# Patient Record
Sex: MALE | Race: WHITE | NOT HISPANIC OR LATINO | Employment: UNEMPLOYED | URBAN - METROPOLITAN AREA
[De-identification: names, ages, dates, MRNs, and addresses within clinical notes are randomized per-mention and may not be internally consistent; named-entity substitution may affect disease eponyms.]

---

## 2017-07-10 ENCOUNTER — HOSPITAL ENCOUNTER (INPATIENT)
Facility: MEDICAL CENTER | Age: 62
LOS: 8 days | DRG: 380 | End: 2017-07-18
Attending: EMERGENCY MEDICINE | Admitting: HOSPITALIST
Payer: COMMERCIAL

## 2017-07-10 ENCOUNTER — RESOLUTE PROFESSIONAL BILLING HOSPITAL PROF FEE (OUTPATIENT)
Dept: HOSPITALIST | Facility: MEDICAL CENTER | Age: 62
End: 2017-07-10
Payer: COMMERCIAL

## 2017-07-10 DIAGNOSIS — K92.2 UPPER GI BLEED: ICD-10-CM

## 2017-07-10 DIAGNOSIS — E10.10 DIABETIC KETOACIDOSIS WITHOUT COMA ASSOCIATED WITH TYPE 1 DIABETES MELLITUS (HCC): ICD-10-CM

## 2017-07-10 PROBLEM — E11.10 DKA (DIABETIC KETOACIDOSES): Status: ACTIVE | Noted: 2017-07-10

## 2017-07-10 LAB
ABO GROUP BLD: NORMAL
ALBUMIN SERPL BCP-MCNC: 3.4 G/DL (ref 3.2–4.9)
ALBUMIN/GLOB SERPL: 1 G/DL
ALP SERPL-CCNC: 64 U/L (ref 30–99)
ALT SERPL-CCNC: <5 U/L (ref 2–50)
ANION GAP SERPL CALC-SCNC: 17 MMOL/L (ref 0–11.9)
ANISOCYTOSIS BLD QL SMEAR: ABNORMAL
AST SERPL-CCNC: 6 U/L (ref 12–45)
BASOPHILS # BLD AUTO: 0.9 % (ref 0–1.8)
BASOPHILS # BLD: 0.18 K/UL (ref 0–0.12)
BILIRUB SERPL-MCNC: 0.4 MG/DL (ref 0.1–1.5)
BLD GP AB SCN SERPL QL: NORMAL
BUN SERPL-MCNC: 23 MG/DL (ref 8–22)
CALCIUM SERPL-MCNC: 9 MG/DL (ref 8.5–10.5)
CHLORIDE SERPL-SCNC: 106 MMOL/L (ref 96–112)
CO2 SERPL-SCNC: 10 MMOL/L (ref 20–33)
CREAT SERPL-MCNC: 1.27 MG/DL (ref 0.5–1.4)
EOSINOPHIL # BLD AUTO: 0 K/UL (ref 0–0.51)
EOSINOPHIL NFR BLD: 0 % (ref 0–6.9)
ERYTHROCYTE [DISTWIDTH] IN BLOOD BY AUTOMATED COUNT: 44.2 FL (ref 35.9–50)
GFR SERPL CREATININE-BSD FRML MDRD: 58 ML/MIN/1.73 M 2
GLOBULIN SER CALC-MCNC: 3.5 G/DL (ref 1.9–3.5)
GLUCOSE SERPL-MCNC: 294 MG/DL (ref 65–99)
HCT VFR BLD AUTO: 41.2 % (ref 42–52)
HGB BLD-MCNC: 13.8 G/DL (ref 14–18)
LYMPHOCYTES # BLD AUTO: 0.69 K/UL (ref 1–4.8)
LYMPHOCYTES NFR BLD: 3.5 % (ref 22–41)
MANUAL DIFF BLD: NORMAL
MCH RBC QN AUTO: 30.2 PG (ref 27–33)
MCHC RBC AUTO-ENTMCNC: 33.5 G/DL (ref 33.7–35.3)
MCV RBC AUTO: 90.2 FL (ref 81.4–97.8)
MICROCYTES BLD QL SMEAR: ABNORMAL
MONOCYTES # BLD AUTO: 0.86 K/UL (ref 0–0.85)
MONOCYTES NFR BLD AUTO: 4.4 % (ref 0–13.4)
MORPHOLOGY BLD-IMP: NORMAL
MYELOCYTES NFR BLD MANUAL: 0.9 %
NEUTROPHILS # BLD AUTO: 17.7 K/UL (ref 1.82–7.42)
NEUTROPHILS NFR BLD: 89.4 % (ref 44–72)
NEUTS BAND NFR BLD MANUAL: 0.9 % (ref 0–10)
NRBC # BLD AUTO: 0 K/UL
NRBC BLD AUTO-RTO: 0 /100 WBC
PLATELET # BLD AUTO: 488 K/UL (ref 164–446)
PLATELET BLD QL SMEAR: NORMAL
PMV BLD AUTO: 9.3 FL (ref 9–12.9)
POTASSIUM SERPL-SCNC: 4.7 MMOL/L (ref 3.6–5.5)
PROT SERPL-MCNC: 6.9 G/DL (ref 6–8.2)
RBC # BLD AUTO: 4.57 M/UL (ref 4.7–6.1)
RBC BLD AUTO: PRESENT
RH BLD: NORMAL
SODIUM SERPL-SCNC: 133 MMOL/L (ref 135–145)
WBC # BLD AUTO: 19.6 K/UL (ref 4.8–10.8)

## 2017-07-10 PROCEDURE — 700105 HCHG RX REV CODE 258: Performed by: EMERGENCY MEDICINE

## 2017-07-10 PROCEDURE — 770022 HCHG ROOM/CARE - ICU (200)

## 2017-07-10 PROCEDURE — 700105 HCHG RX REV CODE 258: Performed by: PHARMACIST

## 2017-07-10 PROCEDURE — 36415 COLL VENOUS BLD VENIPUNCTURE: CPT

## 2017-07-10 PROCEDURE — 99291 CRITICAL CARE FIRST HOUR: CPT | Performed by: HOSPITALIST

## 2017-07-10 PROCEDURE — 96365 THER/PROPH/DIAG IV INF INIT: CPT

## 2017-07-10 PROCEDURE — 700102 HCHG RX REV CODE 250 W/ 637 OVERRIDE(OP): Performed by: HOSPITALIST

## 2017-07-10 PROCEDURE — 83735 ASSAY OF MAGNESIUM: CPT

## 2017-07-10 PROCEDURE — 86850 RBC ANTIBODY SCREEN: CPT

## 2017-07-10 PROCEDURE — C9113 INJ PANTOPRAZOLE SODIUM, VIA: HCPCS | Performed by: HOSPITALIST

## 2017-07-10 PROCEDURE — 82962 GLUCOSE BLOOD TEST: CPT

## 2017-07-10 PROCEDURE — A9270 NON-COVERED ITEM OR SERVICE: HCPCS | Performed by: HOSPITALIST

## 2017-07-10 PROCEDURE — C9113 INJ PANTOPRAZOLE SODIUM, VIA: HCPCS | Performed by: EMERGENCY MEDICINE

## 2017-07-10 PROCEDURE — 96366 THER/PROPH/DIAG IV INF ADDON: CPT

## 2017-07-10 PROCEDURE — 86900 BLOOD TYPING SEROLOGIC ABO: CPT

## 2017-07-10 PROCEDURE — 85027 COMPLETE CBC AUTOMATED: CPT

## 2017-07-10 PROCEDURE — 86901 BLOOD TYPING SEROLOGIC RH(D): CPT

## 2017-07-10 PROCEDURE — 700111 HCHG RX REV CODE 636 W/ 250 OVERRIDE (IP): Performed by: HOSPITALIST

## 2017-07-10 PROCEDURE — 85007 BL SMEAR W/DIFF WBC COUNT: CPT

## 2017-07-10 PROCEDURE — 700111 HCHG RX REV CODE 636 W/ 250 OVERRIDE (IP): Performed by: EMERGENCY MEDICINE

## 2017-07-10 PROCEDURE — 99291 CRITICAL CARE FIRST HOUR: CPT

## 2017-07-10 PROCEDURE — 80053 COMPREHEN METABOLIC PANEL: CPT

## 2017-07-10 PROCEDURE — 84100 ASSAY OF PHOSPHORUS: CPT

## 2017-07-10 PROCEDURE — 700101 HCHG RX REV CODE 250: Performed by: HOSPITALIST

## 2017-07-10 PROCEDURE — 80048 BASIC METABOLIC PNL TOTAL CA: CPT

## 2017-07-10 PROCEDURE — 700102 HCHG RX REV CODE 250 W/ 637 OVERRIDE(OP): Performed by: PHARMACIST

## 2017-07-10 PROCEDURE — 700105 HCHG RX REV CODE 258: Performed by: HOSPITALIST

## 2017-07-10 RX ORDER — SODIUM CHLORIDE 9 MG/ML
1000 INJECTION, SOLUTION INTRAVENOUS ONCE
Status: COMPLETED | OUTPATIENT
Start: 2017-07-10 | End: 2017-07-10

## 2017-07-10 RX ORDER — POLYETHYLENE GLYCOL 3350 17 G/17G
1 POWDER, FOR SOLUTION ORAL
Status: DISCONTINUED | OUTPATIENT
Start: 2017-07-10 | End: 2017-07-18 | Stop reason: HOSPADM

## 2017-07-10 RX ORDER — LABETALOL HYDROCHLORIDE 5 MG/ML
10 INJECTION, SOLUTION INTRAVENOUS EVERY 4 HOURS PRN
Status: DISCONTINUED | OUTPATIENT
Start: 2017-07-10 | End: 2017-07-18 | Stop reason: HOSPADM

## 2017-07-10 RX ORDER — SODIUM CHLORIDE 9 MG/ML
INJECTION, SOLUTION INTRAVENOUS CONTINUOUS
Status: DISCONTINUED | OUTPATIENT
Start: 2017-07-10 | End: 2017-07-12

## 2017-07-10 RX ORDER — LISINOPRIL 10 MG/1
10 TABLET ORAL DAILY
COMMUNITY

## 2017-07-10 RX ORDER — MAGNESIUM SULFATE HEPTAHYDRATE 40 MG/ML
2 INJECTION, SOLUTION INTRAVENOUS
Status: COMPLETED | OUTPATIENT
Start: 2017-07-10 | End: 2017-07-11

## 2017-07-10 RX ORDER — LISINOPRIL 10 MG/1
10 TABLET ORAL
Status: DISCONTINUED | OUTPATIENT
Start: 2017-07-11 | End: 2017-07-18 | Stop reason: HOSPADM

## 2017-07-10 RX ORDER — PANTOPRAZOLE SODIUM 40 MG/10ML
40 INJECTION, POWDER, LYOPHILIZED, FOR SOLUTION INTRAVENOUS 2 TIMES DAILY
Status: DISCONTINUED | OUTPATIENT
Start: 2017-07-10 | End: 2017-07-14

## 2017-07-10 RX ORDER — DEXTROSE AND SODIUM CHLORIDE 10; .45 G/100ML; G/100ML
INJECTION, SOLUTION INTRAVENOUS CONTINUOUS
Status: ACTIVE | OUTPATIENT
Start: 2017-07-10 | End: 2017-07-12

## 2017-07-10 RX ORDER — MAGNESIUM SULFATE HEPTAHYDRATE 40 MG/ML
4 INJECTION, SOLUTION INTRAVENOUS
Status: COMPLETED | OUTPATIENT
Start: 2017-07-10 | End: 2017-07-11

## 2017-07-10 RX ORDER — SODIUM CHLORIDE 9 MG/ML
2000 INJECTION, SOLUTION INTRAVENOUS ONCE
Status: COMPLETED | OUTPATIENT
Start: 2017-07-10 | End: 2017-07-10

## 2017-07-10 RX ORDER — DEXTROSE MONOHYDRATE 25 G/50ML
25 INJECTION, SOLUTION INTRAVENOUS
Status: DISCONTINUED | OUTPATIENT
Start: 2017-07-10 | End: 2017-07-10

## 2017-07-10 RX ORDER — DEXTROSE AND SODIUM CHLORIDE 5; .9 G/100ML; G/100ML
INJECTION, SOLUTION INTRAVENOUS CONTINUOUS
Status: DISCONTINUED | OUTPATIENT
Start: 2017-07-10 | End: 2017-07-12

## 2017-07-10 RX ORDER — SIMVASTATIN 10 MG
10 TABLET ORAL EVERY EVENING
Status: DISCONTINUED | OUTPATIENT
Start: 2017-07-10 | End: 2017-07-18 | Stop reason: HOSPADM

## 2017-07-10 RX ORDER — SIMVASTATIN 10 MG
10 TABLET ORAL
COMMUNITY

## 2017-07-10 RX ORDER — AMOXICILLIN 250 MG
2 CAPSULE ORAL 2 TIMES DAILY
Status: DISCONTINUED | OUTPATIENT
Start: 2017-07-10 | End: 2017-07-18 | Stop reason: HOSPADM

## 2017-07-10 RX ORDER — SODIUM CHLORIDE 9 MG/ML
INJECTION, SOLUTION INTRAVENOUS CONTINUOUS
Status: DISCONTINUED | OUTPATIENT
Start: 2017-07-10 | End: 2017-07-10

## 2017-07-10 RX ORDER — BISACODYL 10 MG
10 SUPPOSITORY, RECTAL RECTAL
Status: DISCONTINUED | OUTPATIENT
Start: 2017-07-10 | End: 2017-07-18 | Stop reason: HOSPADM

## 2017-07-10 RX ADMIN — DEXTROSE AND SODIUM CHLORIDE: 5; 900 INJECTION, SOLUTION INTRAVENOUS at 22:48

## 2017-07-10 RX ADMIN — THIAMINE HYDROCHLORIDE 100 MG: 100 INJECTION, SOLUTION INTRAMUSCULAR; INTRAVENOUS at 22:43

## 2017-07-10 RX ADMIN — STANDARDIZED SENNA CONCENTRATE AND DOCUSATE SODIUM 2 TABLET: 8.6; 5 TABLET, FILM COATED ORAL at 23:35

## 2017-07-10 RX ADMIN — SODIUM CHLORIDE 8 MG/HR: 9 INJECTION, SOLUTION INTRAVENOUS at 20:15

## 2017-07-10 RX ADMIN — SODIUM CHLORIDE 1000 ML: 9 INJECTION, SOLUTION INTRAVENOUS at 21:21

## 2017-07-10 RX ADMIN — SODIUM CHLORIDE 4 UNITS/HR: 9 INJECTION, SOLUTION INTRAVENOUS at 22:43

## 2017-07-10 RX ADMIN — SIMVASTATIN 10 MG: 10 TABLET, FILM COATED ORAL at 22:55

## 2017-07-10 RX ADMIN — PANTOPRAZOLE SODIUM 40 MG: 40 INJECTION, POWDER, FOR SOLUTION INTRAVENOUS at 23:08

## 2017-07-10 RX ADMIN — FOLIC ACID 1 MG: 5 INJECTION, SOLUTION INTRAMUSCULAR; INTRAVENOUS; SUBCUTANEOUS at 22:57

## 2017-07-10 RX ADMIN — SODIUM CHLORIDE 2000 ML: 9 INJECTION, SOLUTION INTRAVENOUS at 20:51

## 2017-07-10 RX ADMIN — LABETALOL HYDROCHLORIDE 10 MG: 5 INJECTION, SOLUTION INTRAVENOUS at 23:20

## 2017-07-10 ASSESSMENT — ENCOUNTER SYMPTOMS
RESPIRATORY NEGATIVE: 1
ABDOMINAL PAIN: 0
HEADACHES: 1
NAUSEA: 1
CONSTIPATION: 0
BLOOD IN STOOL: 1
VOMITING: 1
DIARRHEA: 0
MUSCULOSKELETAL NEGATIVE: 1
DIAPHORESIS: 1
EYES NEGATIVE: 1

## 2017-07-10 ASSESSMENT — LIFESTYLE VARIABLES
DO YOU DRINK ALCOHOL: NO
EVER_SMOKED: YES

## 2017-07-10 ASSESSMENT — PAIN SCALES - GENERAL
PAINLEVEL_OUTOF10: 6
PAINLEVEL_OUTOF10: 6

## 2017-07-10 NOTE — IP AVS SNAPSHOT
7/18/2017    [facility]  [address] [facility]  [place] [address]    Dear Nine:    [facility] wants to ensure your discharge home is safe and you or your loved ones have had all of your questions answered regarding your care after you leave the hospital.    Below is a list of resources and contact information should you have any questions regarding your hospital stay, follow-up instructions, or active medical symptoms.    Questions or Concerns Regarding… Contact   Medical Questions Related to Your Discharge  (7 days a week, 8am-5pm) Contact a Nurse Care Coordinator   [phone]   Medical Questions Not Related to Your Discharge  (24 hours a day / 7 days a week)  Contact the Nurse Health Line   [phone]    Medications or Discharge Instructions Refer to your discharge packet   or contact your [facility] Primary Care Provider   [phone]   Follow-up Appointment(s) Schedule your appointment via GMG33   or contact Scheduling [phone]   Billing Review your statement via GMG33  or contact Billing [phone]   Medical Records Review your records via GMG33   or contact Medical Records [phone]     You may receive a telephone call within two days of discharge. This call is to make certain you understand your discharge instructions and have the opportunity to have any questions answered. You can also easily access your medical information, test results and upcoming appointments via the GMG33 free online health management tool. You can learn more and sign up at [email or web address]/[email or web address]. For assistance setting up your GMG33 account, please call [phone].    Once again, we want to ensure your discharge home is safe and that you have a clear understanding of any next steps in your care. If you have any questions or concerns, please do not hesitate to contact us, we are here for you. Thank you for choosing [facility] for your healthcare needs.    Sincerely,    Your [facility] Healthcare Team

## 2017-07-10 NOTE — IP AVS SNAPSHOT
Home Care Instructions                                                                                                                  Name:[facility]  Medical Record Number:[id number]  CSN: [id number]    YOB: 1955   Age: 62 y.o.  Sex: male  HT:1.829 m (6') WT: 79.5 kg (175 lb 4.3 oz)          Admit Date: 7/10/2017     Discharge Date:   Today's Date: 7/18/2017  Attending Doctor:  Shu Celestin M.D.                  Allergies:  Review of patient's allergies indicates no known allergies.            Discharge Instructions       Discharge Instructions    Discharged to other by car with escort. Discharged via wheelchair, hospital escort: Yes.  Special equipment needed: Not Applicable    Be sure to schedule a follow-up appointment with your primary care doctor or any specialists as instructed.     Discharge Plan:   Diet Plan: Discussed  Activity Level: Discussed  Smoking Cessation Offered: Patient Refused  Confirmed Follow up Appointment: Patient to Call and Schedule Appointment (follow up with PCP in 1 week)  Confirmed Symptoms Management: Discussed  Medication Reconciliation Updated: Yes  Pneumococcal Vaccine Given - only chart on this line when given: Given (See MAR)  Influenza Vaccine Indication: Indicated: Not available from distributor/    I understand that a diet low in cholesterol, fat, and sodium is recommended for good health. Unless I have been given specific instructions below for another diet, I accept this instruction as my diet prescription.   Other diet: Regular    Special Instructions: None    · Is patient discharged on Warfarin / Coumadin?   No     · Is patient Post Blood Transfusion?  No    Depression / Suicide Risk    As you are discharged from this [facility] facility, it is important to learn how to keep safe from harming yourself.    Recognize the warning signs:  · Abrupt changes in personality, positive or negative- including increase in energy   · Giving away  possessions  · Change in eating patterns- significant weight changes-  positive or negative  · Change in sleeping patterns- unable to sleep or sleeping all the time   · Unwillingness or inability to communicate  · Depression  · Unusual sadness, discouragement and loneliness  · Talk of wanting to die  · Neglect of personal appearance   · Rebelliousness- reckless behavior  · Withdrawal from people/activities they love  · Confusion- inability to concentrate     If you or a loved one observes any of these behaviors or has concerns about self-harm, here's what you can do:  · Talk about it- your feelings and reasons for harming yourself  · Remove any means that you might use to hurt yourself (examples: pills, rope, extension cords, firearm)  · Get professional help from the community (Mental Health, Substance Abuse, psychological counseling)  · Do not be alone:Call your Safe Contact- someone whom you trust who will be there for you.  · Call your local CRISIS HOTLINE 174-7481 or 259-749-1449  · Call your local Children's Mobile Crisis Response Team [place] (874) 338-0229 or wwwCelframe  · Call the toll free National Suicide Prevention Hotlines   · National Suicide Prevention Lifeline 011-588-FWJC (1598)  · National Hope Line Network 800-SUICIDE (929-2191)        Follow-up Information     1. Follow up with Pcp Pt States None.    Specialty:  Family Medicine         Discharge Medication Instructions:    Below are the medications your physician expects you to take upon discharge:    Review all your home medications and newly ordered medications with your doctor and/or pharmacist. Follow medication instructions as directed by your doctor and/or pharmacist.    Please keep your medication list with you and share with your physician.               Medication List      START taking these medications        Instructions    Morning Afternoon Evening Bedtime    amoxicillin-clavulanate 875-125 MG Tabs   Last time this  was given:  1 Tab on 7/18/2017  7:54 AM   Commonly known as:  AUGMENTIN   Next Dose Due:  7/18 8pm        Take 1 Tab by mouth every 12 hours.   Dose:  1 Tab                        insulin lispro 100 UNIT/ML Soln   Last time this was given:  2 Units on 7/18/2017  6:29 AM   Commonly known as:  HUMALOG   Next Dose Due:  7/18 before lunch        Inject 2-9 Units as instructed 4 Times a Day,Before Meals and at Bedtime.   Dose:  2-9 Units                        insulin  UNIT/ML Susp   Last time this was given:  27 Units on 7/18/2017  6:29 AM   Commonly known as:  HUMULIN,NOVOLIN   Next Dose Due:  7/18 pm        Inject 24 Units as instructed 2 Times a Day.   Dose:  24 Units                        omeprazole 40 MG delayed-release capsule   Last time this was given:  40 mg on 7/18/2017  7:54 AM   Commonly known as:  PRILOSEC   Next Dose Due:  7/18 pm        Take 1 Cap by mouth 2 Times a Day.   Dose:  40 mg                          CONTINUE taking these medications        Instructions    Morning Afternoon Evening Bedtime    lisinopril 10 MG Tabs   Last time this was given:  10 mg on 7/18/2017  7:54 AM   Commonly known as:  PRINIVIL   Next Dose Due:  7/19 am        Take 10 mg by mouth every day.   Dose:  10 mg                        metformin 850 MG Tabs   Commonly known as:  GLUCOPHAGE   Next Dose Due:  7/18 pm        Take 850 mg by mouth 3 times a day.   Dose:  850 mg                        simvastatin 10 MG Tabs   Last time this was given:  10 mg on 7/17/2017  8:13 PM   Commonly known as:  ZOCOR   Next Dose Due:  7/18 pm        Take 10 mg by mouth every bedtime.   Dose:  10 mg                          STOP taking these medications     aspirin EC 81 MG Tbec   Commonly known as:  ECOTRIN                    Where to Get Your Medications      Information about where to get these medications is not yet available     ! Ask your nurse or doctor about these medications    - amoxicillin-clavulanate 875-125 MG Tabs  - insulin  lispro 100 UNIT/ML Soln  - insulin  UNIT/ML Susp  - omeprazole 40 MG delayed-release capsule            Instructions           Diet / Nutrition:    Follow any diet instructions given to you by your doctor or the dietician, including how much salt (sodium) you are allowed each day.    If you are overweight, talk to your doctor about a weight reduction plan.    Activity:    Remain physically active following your doctor's instructions about exercise and activity.    Rest often.     Any time you become even a little tired or short of breath, SIT DOWN and rest.    Worsening Symptoms:    Report any of the following signs and symptoms to the doctor's office immediately:    *Pain of jaw, arm, or neck  *Chest pain not relieved by medication                               *Dizziness or loss of consciousness  *Difficulty breathing even when at rest   *More tired than usual                                       *Bleeding drainage or swelling of surgical site  *Swelling of feet, ankles, legs or stomach                 *Fever (>100ºF)  *Pink or blood tinged sputum  *Weight gain (3lbs/day or 5lbs /week)           *Shock from internal defibrillator (if applicable)  *Palpitations or irregular heartbeats                *Cool and/or numb extremities    Stroke Awareness    Common Risk Factors for Stroke include:    Age  Atrial Fibrillation  Carotid Artery Stenosis  Diabetes Mellitus  Excessive alcohol consumption  High blood pressure  Overweight   Physical inactivity  Smoking    Warning signs and symptoms of a stroke include:    *Sudden numbness or weakness of the face, arm or leg (especially on one side of the body).  *Sudden confusion, trouble speaking or understanding.  *Sudden trouble seeing in one or both eyes.  *Sudden trouble walking, dizziness, loss of balance or coordination.Sudden severe headache with no known cause.    It is very important to get treatment quickly when a stroke occurs. If you experience any of the  above warning signs, call 371 immediately.                   Disclaimer         Quit Smoking / Tobacco Use:    I understand the use of any tobacco products increases my chance of suffering from future heart disease or stroke and could cause other illnesses which may shorten my life. Quitting the use of tobacco products is the single most important thing I can do to improve my health. For further information on smoking / tobacco cessation call a Toll Free Quit Line at 1-990.418.9329 (*National [employer]ure) or 1-409.116.6291 (American Lung Association) or you can access the web based program at www.lungusa.org.    [place] Tobacco Users Help Line:  (142) 630-2840       Toll Free: 1-341.116.6298  Quit Tobacco Program [facility] (902)238-6253    Crisis Hotline:    [place] Crisis Hotline:  8-449-NODPDYU or 1-837.607.7733    [place] Crisis Hotline:    1-614.106.3070 or 973-133-1540    Discharge Survey:   Thank you for choosing [facility]. We hope we did everything we could to make your hospital stay a pleasant one. You may be receiving a phone survey and we would appreciate your time and participation in answering the questions. Your input is very valuable to us in our efforts to improve our service to our patients and their families.        My signature on this form indicates that:    1. I have reviewed and understand the above information.  2. My questions regarding this information have been answered to my satisfaction.  3. I have formulated a plan with my discharge nurse to obtain my prescribed medications for home.                  Disclaimer         __________________________________                     __________       ________                       Patient Signature                                                 Date                    Time

## 2017-07-10 NOTE — IP AVS SNAPSHOT
" <p align=\"LEFT\"><IMG SRC=\"//[email or web address]$/[email or web address]\" alt=\"Image\" WIDTH=\"50%\" HEIGHT=\"200\" BORDER=\"\"></p>                   Name:Marge Frye  Medical Record Number:[id number]  CSN: [id number]    YOB: 1955   Age: 62 y.o.  Sex: male  HT:1.829 m (6') WT: 79.5 kg (175 lb 4.3 oz)          Admit Date: 7/10/2017     Discharge Date:   Today's Date: 7/18/2017  Attending Doctor:  Shu Celestin M.D.                  Allergies:  Review of patient's allergies indicates no known allergies.          Follow-up Information     1. Follow up with Pcp Pt States None.    Specialty:  Family Medicine         Medication List      Take these Medications        Instructions    amoxicillin-clavulanate 875-125 MG Tabs   Commonly known as:  AUGMENTIN    Take 1 Tab by mouth every 12 hours.   Dose:  1 Tab       insulin lispro 100 UNIT/ML Soln   Commonly known as:  HUMALOG    Inject 2-9 Units as instructed 4 Times a Day,Before Meals and at Bedtime.   Dose:  2-9 Units       insulin  UNIT/ML Susp   Commonly known as:  HUMULIN,NOVOLIN    Inject 24 Units as instructed 2 Times a Day.   Dose:  24 Units       lisinopril 10 MG Tabs   Commonly known as:  PRINIVIL    Take 10 mg by mouth every day.   Dose:  10 mg       metformin 850 MG Tabs   Commonly known as:  GLUCOPHAGE    Take 850 mg by mouth 3 times a day.   Dose:  850 mg       omeprazole 40 MG delayed-release capsule   Commonly known as:  PRILOSEC    Take 1 Cap by mouth 2 Times a Day.   Dose:  40 mg       simvastatin 10 MG Tabs   Commonly known as:  ZOCOR    Take 10 mg by mouth every bedtime.   Dose:  10 mg         "

## 2017-07-10 NOTE — IP AVS SNAPSHOT
WorldRemit Access Code: [email or web address]  Expires: 8/17/2017 11:21 AM    Your email address is not on file at [facility] [employer].  Email Addresses are required for you to sign up for WorldRemit, please contact [phone] to verify your personal information and to provide your email address prior to attempting to register for WorldRemit.    [address] [address]    WorldRemit  A secure, online tool to manage your health information     [employer]’s WorldRemit® is a secure, online tool that connects you to your personalized health information from the privacy of your home -- day or night - making it very easy for you to manage your healthcare. Once the activation process is completed, you can even access your medical information using the WorldRemit tanner, which is available for free in the Apple Tanner store or Google Play store.     To learn more about WorldRemit, visit [email or web address]    There are two levels of access available (as shown below):   My Chart Features  [facility] Primary Care Doctor [facility]  Specialists [facility]  Urgent  Care Non-[facility] Primary Care Doctor   Email your healthcare team securely and privately 24/7 X X X    Manage appointments: schedule your next appointment; view details of past/upcoming appointments X      Request prescription refills. X      View recent personal medical records, including lab and immunizations X X X X   View health record, including health history, allergies, medications X X X X   Read reports about your outpatient visits, procedures, consult and ER notes X X X X   See your discharge summary, which is a recap of your hospital and/or ER visit that includes your diagnosis, lab results, and care plan X X  X     How to register for [employer]:  Once your e-mail address has been verified, follow the following steps to sign up for WorldRemit.     1. Go to  [email or web address]  2. Click on the Sign Up Now box, which takes you to the New Member Sign Up page. You will  need to provide the following information:  a. Enter your oboxo Access Code exactly as it appears at the top of this page. (You will not need to use this code after you’ve completed the sign-up process. If you do not sign up before the expiration date, you must request a new code.)   b. Enter your date of birth.   c. Enter your home email address.   d. Click Submit, and follow the next screen’s instructions.  3. Create a DNART LIMITADAt ID. This will be your oboxo login ID and cannot be changed, so think of one that is secure and easy to remember.  4. Create a oboxo password. You can change your password at any time.  5. Enter your Password Reset Question and Answer. This can be used at a later time if you forget your password.   6. Enter your e-mail address. This allows you to receive e-mail notifications when new information is available in oboxo.  7. Click Sign Up. You can now view your health information.    For assistance activating your oboxo account, call [phone]

## 2017-07-11 ENCOUNTER — APPOINTMENT (OUTPATIENT)
Dept: RADIOLOGY | Facility: MEDICAL CENTER | Age: 62
DRG: 380 | End: 2017-07-11
Attending: HOSPITALIST
Payer: COMMERCIAL

## 2017-07-11 ENCOUNTER — HOSPITAL ENCOUNTER (OUTPATIENT)
Dept: RADIOLOGY | Facility: MEDICAL CENTER | Age: 62
End: 2017-07-11

## 2017-07-11 PROBLEM — R51 HEADACHE(784.0): Status: ACTIVE | Noted: 2017-07-11

## 2017-07-11 PROBLEM — E78.5 DYSLIPIDEMIA: Status: ACTIVE | Noted: 2017-07-11

## 2017-07-11 PROBLEM — D72.829 LEUKOCYTOSIS: Status: ACTIVE | Noted: 2017-07-11

## 2017-07-11 PROBLEM — D62 ANEMIA ASSOCIATED WITH ACUTE BLOOD LOSS: Status: ACTIVE | Noted: 2017-07-11

## 2017-07-11 PROBLEM — K92.2 GI BLEED: Status: ACTIVE | Noted: 2017-07-11

## 2017-07-11 LAB
ABO GROUP BLD: NORMAL
ANION GAP SERPL CALC-SCNC: 10 MMOL/L (ref 0–11.9)
ANION GAP SERPL CALC-SCNC: 18 MMOL/L (ref 0–11.9)
ANION GAP SERPL CALC-SCNC: 7 MMOL/L (ref 0–11.9)
ANION GAP SERPL CALC-SCNC: 7 MMOL/L (ref 0–11.9)
ANION GAP SERPL CALC-SCNC: 8 MMOL/L (ref 0–11.9)
ANION GAP SERPL CALC-SCNC: 9 MMOL/L (ref 0–11.9)
BASOPHILS # BLD AUTO: 0.2 % (ref 0–1.8)
BASOPHILS # BLD: 0.03 K/UL (ref 0–0.12)
BUN SERPL-MCNC: 10 MG/DL (ref 8–22)
BUN SERPL-MCNC: 11 MG/DL (ref 8–22)
BUN SERPL-MCNC: 13 MG/DL (ref 8–22)
BUN SERPL-MCNC: 14 MG/DL (ref 8–22)
BUN SERPL-MCNC: 15 MG/DL (ref 8–22)
BUN SERPL-MCNC: 21 MG/DL (ref 8–22)
CALCIUM SERPL-MCNC: 7.5 MG/DL (ref 8.5–10.5)
CALCIUM SERPL-MCNC: 7.9 MG/DL (ref 8.5–10.5)
CALCIUM SERPL-MCNC: 8 MG/DL (ref 8.5–10.5)
CALCIUM SERPL-MCNC: 8.1 MG/DL (ref 8.5–10.5)
CALCIUM SERPL-MCNC: 8.6 MG/DL (ref 8.5–10.5)
CALCIUM SERPL-MCNC: 9 MG/DL (ref 8.5–10.5)
CHLORIDE SERPL-SCNC: 106 MMOL/L (ref 96–112)
CHLORIDE SERPL-SCNC: 110 MMOL/L (ref 96–112)
CHLORIDE SERPL-SCNC: 111 MMOL/L (ref 96–112)
CHLORIDE SERPL-SCNC: 111 MMOL/L (ref 96–112)
CHLORIDE SERPL-SCNC: 112 MMOL/L (ref 96–112)
CHLORIDE SERPL-SCNC: 114 MMOL/L (ref 96–112)
CO2 SERPL-SCNC: 11 MMOL/L (ref 20–33)
CO2 SERPL-SCNC: 15 MMOL/L (ref 20–33)
CO2 SERPL-SCNC: 15 MMOL/L (ref 20–33)
CO2 SERPL-SCNC: 17 MMOL/L (ref 20–33)
CO2 SERPL-SCNC: 17 MMOL/L (ref 20–33)
CO2 SERPL-SCNC: 9 MMOL/L (ref 20–33)
CREAT SERPL-MCNC: 0.79 MG/DL (ref 0.5–1.4)
CREAT SERPL-MCNC: 0.88 MG/DL (ref 0.5–1.4)
CREAT SERPL-MCNC: 0.9 MG/DL (ref 0.5–1.4)
CREAT SERPL-MCNC: 0.97 MG/DL (ref 0.5–1.4)
CREAT SERPL-MCNC: 0.97 MG/DL (ref 0.5–1.4)
CREAT SERPL-MCNC: 1.24 MG/DL (ref 0.5–1.4)
EOSINOPHIL # BLD AUTO: 0.16 K/UL (ref 0–0.51)
EOSINOPHIL NFR BLD: 1.2 % (ref 0–6.9)
ERYTHROCYTE [DISTWIDTH] IN BLOOD BY AUTOMATED COUNT: 49.3 FL (ref 35.9–50)
EST. AVERAGE GLUCOSE BLD GHB EST-MCNC: 289 MG/DL
GFR SERPL CREATININE-BSD FRML MDRD: 59 ML/MIN/1.73 M 2
GFR SERPL CREATININE-BSD FRML MDRD: >60 ML/MIN/1.73 M 2
GLUCOSE BLD-MCNC: 116 MG/DL (ref 65–99)
GLUCOSE BLD-MCNC: 122 MG/DL (ref 65–99)
GLUCOSE BLD-MCNC: 125 MG/DL (ref 65–99)
GLUCOSE BLD-MCNC: 126 MG/DL (ref 65–99)
GLUCOSE BLD-MCNC: 128 MG/DL (ref 65–99)
GLUCOSE BLD-MCNC: 132 MG/DL (ref 65–99)
GLUCOSE BLD-MCNC: 137 MG/DL (ref 65–99)
GLUCOSE BLD-MCNC: 140 MG/DL (ref 65–99)
GLUCOSE BLD-MCNC: 148 MG/DL (ref 65–99)
GLUCOSE BLD-MCNC: 150 MG/DL (ref 65–99)
GLUCOSE BLD-MCNC: 153 MG/DL (ref 65–99)
GLUCOSE BLD-MCNC: 165 MG/DL (ref 65–99)
GLUCOSE BLD-MCNC: 174 MG/DL (ref 65–99)
GLUCOSE BLD-MCNC: 180 MG/DL (ref 65–99)
GLUCOSE BLD-MCNC: 210 MG/DL (ref 65–99)
GLUCOSE BLD-MCNC: 231 MG/DL (ref 65–99)
GLUCOSE BLD-MCNC: 232 MG/DL (ref 65–99)
GLUCOSE BLD-MCNC: 243 MG/DL (ref 65–99)
GLUCOSE BLD-MCNC: 263 MG/DL (ref 65–99)
GLUCOSE BLD-MCNC: 274 MG/DL (ref 65–99)
GLUCOSE BLD-MCNC: 464 MG/DL (ref 65–99)
GLUCOSE SERPL-MCNC: 155 MG/DL (ref 65–99)
GLUCOSE SERPL-MCNC: 185 MG/DL (ref 65–99)
GLUCOSE SERPL-MCNC: 206 MG/DL (ref 65–99)
GLUCOSE SERPL-MCNC: 234 MG/DL (ref 65–99)
GLUCOSE SERPL-MCNC: 258 MG/DL (ref 65–99)
GLUCOSE SERPL-MCNC: 690 MG/DL (ref 65–99)
HBA1C MFR BLD: 11.7 % (ref 0–5.6)
HCT VFR BLD AUTO: 31.3 % (ref 42–52)
HGB BLD-MCNC: 11.3 G/DL (ref 14–18)
HGB BLD-MCNC: 9.9 G/DL (ref 14–18)
IMM GRANULOCYTES # BLD AUTO: 0.1 K/UL (ref 0–0.11)
IMM GRANULOCYTES NFR BLD AUTO: 0.8 % (ref 0–0.9)
LYMPHOCYTES # BLD AUTO: 1.25 K/UL (ref 1–4.8)
LYMPHOCYTES NFR BLD: 9.6 % (ref 22–41)
MAGNESIUM SERPL-MCNC: 1.9 MG/DL (ref 1.5–2.5)
MCH RBC QN AUTO: 30.2 PG (ref 27–33)
MCHC RBC AUTO-ENTMCNC: 31.1 G/DL (ref 33.7–35.3)
MCV RBC AUTO: 96.9 FL (ref 81.4–97.8)
MONOCYTES # BLD AUTO: 0.7 K/UL (ref 0–0.85)
MONOCYTES NFR BLD AUTO: 5.4 % (ref 0–13.4)
NEUTROPHILS # BLD AUTO: 10.8 K/UL (ref 1.82–7.42)
NEUTROPHILS NFR BLD: 82.8 % (ref 44–72)
NRBC # BLD AUTO: 0 K/UL
NRBC BLD AUTO-RTO: 0 /100 WBC
PHOSPHATE SERPL-MCNC: 2.7 MG/DL (ref 2.5–4.5)
PLATELET # BLD AUTO: 272 K/UL (ref 164–446)
PMV BLD AUTO: 9.1 FL (ref 9–12.9)
POTASSIUM SERPL-SCNC: 3.4 MMOL/L (ref 3.6–5.5)
POTASSIUM SERPL-SCNC: 4.1 MMOL/L (ref 3.6–5.5)
POTASSIUM SERPL-SCNC: 4.2 MMOL/L (ref 3.6–5.5)
POTASSIUM SERPL-SCNC: 4.4 MMOL/L (ref 3.6–5.5)
POTASSIUM SERPL-SCNC: 4.5 MMOL/L (ref 3.6–5.5)
POTASSIUM SERPL-SCNC: 5.1 MMOL/L (ref 3.6–5.5)
RBC # BLD AUTO: 3.25 M/UL (ref 4.7–6.1)
SODIUM SERPL-SCNC: 130 MMOL/L (ref 135–145)
SODIUM SERPL-SCNC: 133 MMOL/L (ref 135–145)
SODIUM SERPL-SCNC: 136 MMOL/L (ref 135–145)
WBC # BLD AUTO: 13 K/UL (ref 4.8–10.8)

## 2017-07-11 PROCEDURE — 700102 HCHG RX REV CODE 250 W/ 637 OVERRIDE(OP): Performed by: PHARMACIST

## 2017-07-11 PROCEDURE — 85018 HEMOGLOBIN: CPT

## 2017-07-11 PROCEDURE — 82962 GLUCOSE BLOOD TEST: CPT | Mod: 91

## 2017-07-11 PROCEDURE — 700102 HCHG RX REV CODE 250 W/ 637 OVERRIDE(OP): Performed by: HOSPITALIST

## 2017-07-11 PROCEDURE — 99291 CRITICAL CARE FIRST HOUR: CPT | Performed by: HOSPITALIST

## 2017-07-11 PROCEDURE — 80048 BASIC METABOLIC PNL TOTAL CA: CPT | Mod: 91

## 2017-07-11 PROCEDURE — 85025 COMPLETE CBC W/AUTO DIFF WBC: CPT

## 2017-07-11 PROCEDURE — C9113 INJ PANTOPRAZOLE SODIUM, VIA: HCPCS | Performed by: HOSPITALIST

## 2017-07-11 PROCEDURE — 700105 HCHG RX REV CODE 258: Performed by: HOSPITALIST

## 2017-07-11 PROCEDURE — A9270 NON-COVERED ITEM OR SERVICE: HCPCS | Performed by: HOSPITALIST

## 2017-07-11 PROCEDURE — 700111 HCHG RX REV CODE 636 W/ 250 OVERRIDE (IP): Performed by: HOSPITALIST

## 2017-07-11 PROCEDURE — 700105 HCHG RX REV CODE 258: Performed by: PHARMACIST

## 2017-07-11 PROCEDURE — 770022 HCHG ROOM/CARE - ICU (200)

## 2017-07-11 PROCEDURE — 83036 HEMOGLOBIN GLYCOSYLATED A1C: CPT

## 2017-07-11 PROCEDURE — 72040 X-RAY EXAM NECK SPINE 2-3 VW: CPT

## 2017-07-11 RX ORDER — THIAMINE MONONITRATE (VIT B1) 100 MG
100 TABLET ORAL DAILY
Status: COMPLETED | OUTPATIENT
Start: 2017-07-11 | End: 2017-07-13

## 2017-07-11 RX ORDER — OXYCODONE HYDROCHLORIDE 10 MG/1
10 TABLET ORAL EVERY 4 HOURS PRN
Status: DISCONTINUED | OUTPATIENT
Start: 2017-07-11 | End: 2017-07-15

## 2017-07-11 RX ORDER — POTASSIUM CHLORIDE 7.45 MG/ML
10 INJECTION INTRAVENOUS ONCE
Status: COMPLETED | OUTPATIENT
Start: 2017-07-11 | End: 2017-07-11

## 2017-07-11 RX ORDER — POTASSIUM CHLORIDE 7.45 MG/ML
10 INJECTION INTRAVENOUS
Status: COMPLETED | OUTPATIENT
Start: 2017-07-11 | End: 2017-07-11

## 2017-07-11 RX ORDER — FOLIC ACID 1 MG/1
1 TABLET ORAL DAILY
Status: COMPLETED | OUTPATIENT
Start: 2017-07-11 | End: 2017-07-13

## 2017-07-11 RX ORDER — MORPHINE SULFATE 4 MG/ML
1-2 INJECTION, SOLUTION INTRAMUSCULAR; INTRAVENOUS EVERY 4 HOURS PRN
Status: DISCONTINUED | OUTPATIENT
Start: 2017-07-11 | End: 2017-07-11

## 2017-07-11 RX ORDER — ACETAMINOPHEN 325 MG/1
650 TABLET ORAL EVERY 6 HOURS PRN
Status: DISCONTINUED | OUTPATIENT
Start: 2017-07-11 | End: 2017-07-18 | Stop reason: HOSPADM

## 2017-07-11 RX ORDER — OXYCODONE HYDROCHLORIDE 5 MG/1
5 TABLET ORAL EVERY 4 HOURS PRN
Status: DISCONTINUED | OUTPATIENT
Start: 2017-07-11 | End: 2017-07-18 | Stop reason: HOSPADM

## 2017-07-11 RX ORDER — POTASSIUM CHLORIDE 7.45 MG/ML
10 INJECTION INTRAVENOUS
Status: COMPLETED | OUTPATIENT
Start: 2017-07-11 | End: 2017-07-12

## 2017-07-11 RX ORDER — SODIUM CHLORIDE 9 MG/ML
2000 INJECTION, SOLUTION INTRAVENOUS ONCE
Status: COMPLETED | OUTPATIENT
Start: 2017-07-11 | End: 2017-07-11

## 2017-07-11 RX ADMIN — ACETAMINOPHEN 650 MG: 325 TABLET, FILM COATED ORAL at 10:07

## 2017-07-11 RX ADMIN — POTASSIUM CHLORIDE 10 MEQ: 7.46 INJECTION, SOLUTION INTRAVENOUS at 23:18

## 2017-07-11 RX ADMIN — DEXTROSE AND SODIUM CHLORIDE: 10; .45 INJECTION, SOLUTION INTRAVENOUS at 20:00

## 2017-07-11 RX ADMIN — SODIUM CHLORIDE 2000 ML: 9 INJECTION, SOLUTION INTRAVENOUS at 04:22

## 2017-07-11 RX ADMIN — OXYCODONE HYDROCHLORIDE 10 MG: 10 TABLET ORAL at 16:08

## 2017-07-11 RX ADMIN — POTASSIUM CHLORIDE 10 MEQ: 7.46 INJECTION, SOLUTION INTRAVENOUS at 11:20

## 2017-07-11 RX ADMIN — Medication 100 MG: at 10:42

## 2017-07-11 RX ADMIN — POTASSIUM CHLORIDE 10 MEQ: 7.46 INJECTION, SOLUTION INTRAVENOUS at 00:20

## 2017-07-11 RX ADMIN — PANTOPRAZOLE SODIUM 40 MG: 40 INJECTION, POWDER, FOR SOLUTION INTRAVENOUS at 10:08

## 2017-07-11 RX ADMIN — POTASSIUM CHLORIDE 10 MEQ: 7.46 INJECTION, SOLUTION INTRAVENOUS at 22:17

## 2017-07-11 RX ADMIN — DEXTROSE AND SODIUM CHLORIDE: 5; 900 INJECTION, SOLUTION INTRAVENOUS at 04:56

## 2017-07-11 RX ADMIN — SODIUM CHLORIDE 5 UNITS/HR: 9 INJECTION, SOLUTION INTRAVENOUS at 04:21

## 2017-07-11 RX ADMIN — MAGNESIUM SULFATE IN WATER 2 G: 40 INJECTION, SOLUTION INTRAVENOUS at 00:39

## 2017-07-11 RX ADMIN — POTASSIUM CHLORIDE 10 MEQ: 7.46 INJECTION, SOLUTION INTRAVENOUS at 10:08

## 2017-07-11 RX ADMIN — SODIUM CHLORIDE 5 UNITS/HR: 9 INJECTION, SOLUTION INTRAVENOUS at 16:09

## 2017-07-11 RX ADMIN — POTASSIUM CHLORIDE 10 MEQ: 7.46 INJECTION, SOLUTION INTRAVENOUS at 18:49

## 2017-07-11 RX ADMIN — LISINOPRIL 10 MG: 10 TABLET ORAL at 10:09

## 2017-07-11 RX ADMIN — PANTOPRAZOLE SODIUM 40 MG: 40 INJECTION, POWDER, FOR SOLUTION INTRAVENOUS at 22:19

## 2017-07-11 RX ADMIN — STANDARDIZED SENNA CONCENTRATE AND DOCUSATE SODIUM 2 TABLET: 8.6; 5 TABLET, FILM COATED ORAL at 22:19

## 2017-07-11 RX ADMIN — OXYCODONE HYDROCHLORIDE 10 MG: 10 TABLET ORAL at 22:20

## 2017-07-11 RX ADMIN — FOLIC ACID 1 MG: 1 TABLET ORAL at 10:42

## 2017-07-11 RX ADMIN — DEXTROSE AND SODIUM CHLORIDE: 5; 900 INJECTION, SOLUTION INTRAVENOUS at 16:09

## 2017-07-11 RX ADMIN — SODIUM CHLORIDE: 9 INJECTION, SOLUTION INTRAVENOUS at 04:21

## 2017-07-11 RX ADMIN — POTASSIUM CHLORIDE 10 MEQ: 7.46 INJECTION, SOLUTION INTRAVENOUS at 00:39

## 2017-07-11 RX ADMIN — POTASSIUM CHLORIDE 10 MEQ: 7.46 INJECTION, SOLUTION INTRAVENOUS at 04:45

## 2017-07-11 RX ADMIN — SIMVASTATIN 10 MG: 10 TABLET, FILM COATED ORAL at 22:19

## 2017-07-11 RX ADMIN — MORPHINE SULFATE 2 MG: 4 INJECTION INTRAVENOUS at 04:21

## 2017-07-11 ASSESSMENT — ENCOUNTER SYMPTOMS
CARDIOVASCULAR NEGATIVE: 1
BACK PAIN: 0
COUGH: 0
CHILLS: 0
GASTROINTESTINAL NEGATIVE: 1
NECK PAIN: 1
HEADACHES: 1
SEIZURES: 0
FEVER: 0
ORTHOPNEA: 0
CONSTITUTIONAL NEGATIVE: 1
WHEEZING: 0
SHORTNESS OF BREATH: 0
FOCAL WEAKNESS: 0
FALLS: 0
PSYCHIATRIC NEGATIVE: 1
ABDOMINAL PAIN: 0
VOMITING: 0
EYES NEGATIVE: 1
RESPIRATORY NEGATIVE: 1
EYE REDNESS: 0
NAUSEA: 0
SPEECH CHANGE: 0
EYE DISCHARGE: 0

## 2017-07-11 ASSESSMENT — PAIN SCALES - GENERAL
PAINLEVEL_OUTOF10: 8
PAINLEVEL_OUTOF10: 0
PAINLEVEL_OUTOF10: 2
PAINLEVEL_OUTOF10: 5
PAINLEVEL_OUTOF10: 10
PAINLEVEL_OUTOF10: 3
PAINLEVEL_OUTOF10: 4
PAINLEVEL_OUTOF10: 4
PAINLEVEL_OUTOF10: 2
PAINLEVEL_OUTOF10: 8

## 2017-07-11 NOTE — PROGRESS NOTES
Gastroenterology Progress Note     Author: Den Whartonkranthijmitchelyi   Date & Time Created: 7/11/2017 1:22 PM    Interval History:  No new events  Getting through DKA protocol  No signs of GI bleeding  No abdominal pain    Review of Systems:  Review of Systems   Constitutional: Negative.    HENT: Negative.    Eyes: Negative.    Respiratory: Negative.    Cardiovascular: Negative.    Gastrointestinal: Negative.    Genitourinary: Negative.    Skin: Negative.        Physical Exam:  Physical Exam   Constitutional: He is oriented to person, place, and time. He appears well-developed.   HENT:   Head: Normocephalic.   Eyes: Pupils are equal, round, and reactive to light.   Neck: Normal range of motion.   Cardiovascular: Normal rate and regular rhythm.    Pulmonary/Chest: Effort normal and breath sounds normal.   Abdominal: Soft. Bowel sounds are normal.   Neurological: He is alert and oriented to person, place, and time.       Labs:        Invalid input(s): [id number]      Recent Labs      07/10/17   2245   07/11/17 0440 07/11/17   0710  07/11/17   1050   SODIUM  133*   < >  136  136  136   POTASSIUM  5.1   < >  4.2  4.1  4.4   CHLORIDE  106   < >  111  114*  112   CO2  9*   < >  15*  15*  17*   BUN  21   < >  15  13  11   CREATININE  1.24   < >  0.97  0.97  0.90   MAGNESIUM  1.9   --    --    --    --    PHOSPHORUS  2.7   --    --    --    --    CALCIUM  9.0   < >  8.6  8.1*  7.9*    < > = values in this interval not displayed.     Recent Labs      07/10/17   1904 07/11/17   0440  07/11/17   0710  07/11/17   1050   ALTSGPT  <5   --    --    --    --    ASTSGOT  6*   --    --    --    --    ALKPHOSPHAT  64   --    --    --    --    TBILIRUBIN  0.4   --    --    --    --    GLUCOSE  294*   < >  206*  155*  185*    < > = values in this interval not displayed.     Recent Labs      07/10/17   1904  07/11/17   0315  07/11/17   1050   RBC  4.57*  3.25*   --    HEMOGLOBIN  13.8*  9.9*  11.3*   HEMATOCRIT  41.2*  31.3*   --     PLATELETCT  488*  272   --      Recent Labs      07/10/17   1904  17   0315   WBC  19.6*  13.0*   NEUTSPOLYS  89.40*  82.80*   LYMPHOCYTES  3.50*  9.60*   MONOCYTES  4.40  5.40   EOSINOPHILS  0.00  1.20   BASOPHILS  0.90  0.20   ASTSGOT  6*   --    ALTSGPT  <5   --    ALKPHOSPHAT  64   --    TBILIRUBIN  0.4   --      Hemodynamics:  Temp (24hrs), Av °C (98.6 °F), Min:36.4 °C (97.6 °F), Max:37.5 °C (99.5 °F)  Temperature: 36.7 °C (98.1 °F)  Pulse  Av.8  Min: 77  Max: 115Heart Rate (Monitored): 78  Blood Pressure: 119/78 mmHg, NIBP: 135/69 mmHg     Respiratory:    Respiration: 19, Pulse Oximetry: 95 %           Fluids:    Intake/Output Summary (Last 24 hours) at 17 1322  Last data filed at 17 0600   Gross per 24 hour   Intake 6393.13 ml   Output   2075 ml   Net 4318.13 ml     Weight: 79.5 kg (175 lb 4.3 oz)  GI/Nutrition:  Orders Placed This Encounter   Procedures   • Diet NPO     Standing Status: Standing      Number of Occurrences: 1      Standing Expiration Date:      Order Specific Question:  Restrict to:     Answer:  Sips with Medications [3]     Medical Decision Making, by Problem:  Active Hospital Problems    Diagnosis   • DKA (diabetic ketoacidoses) (CMS-HCC) [E13.10]       Plan:  Hematemesis:   Plan to treat DKA issues first.  Start clear liquids and advance as tolerated   Hx of NSAIDS possible , esophagitis DU continue on PPI therapy for now and once DKA issues resolved consider EGD.  Call if any issues [phone]    Code [id number]    Core Measures

## 2017-07-11 NOTE — ED NOTES
Med rec updated and complete.  Allergies reviewed per transferring facility  [facility].  No antibiotics noted on [facility].

## 2017-07-11 NOTE — PROGRESS NOTES
[facility] Hospitalist Progress Note    Date of Service: 2017    Chief Complaint  61 y.o. male admitted 7/10/2017 with DKA GIB    Interval Problem Update  No further signs of bleeding, seen in the ICU on insulin drip    Consultants/Specialty  GI            Review of Systems   Constitutional: Negative for fever and chills.   HENT: Negative for congestion and nosebleeds.    Eyes: Negative for discharge and redness.   Respiratory: Negative for cough, shortness of breath and wheezing.    Cardiovascular: Negative for chest pain, orthopnea and leg swelling.   Gastrointestinal: Negative for nausea, vomiting and abdominal pain.   Musculoskeletal: Positive for neck pain. Negative for back pain and falls.   Skin: Negative.    Neurological: Positive for headaches. Negative for speech change, focal weakness and seizures.   Psychiatric/Behavioral: Negative.       Physical Exam  Laboratory/Imaging   Hemodynamics  Temp (24hrs), Av °C (98.6 °F), Min:36.4 °C (97.6 °F), Max:37.5 °C (99.5 °F)   Temperature: 36.7 °C (98.1 °F)  Pulse  Av.8  Min: 77  Max: 115 Heart Rate (Monitored): 78  Blood Pressure: 119/78 mmHg, NIBP: 135/69 mmHg      Respiratory      Respiration: 19, Pulse Oximetry: 95 %             Fluids    Intake/Output Summary (Last 24 hours) at 17 1502  Last data filed at 17 0600   Gross per 24 hour   Intake 6393.13 ml   Output   2075 ml   Net 4318.13 ml       Nutrition  Orders Placed This Encounter   Procedures   • Diet NPO     Standing Status: Standing      Number of Occurrences: 1      Standing Expiration Date:      Order Specific Question:  Restrict to:     Answer:  Sips with Medications [3]     Physical Exam   Constitutional: He is oriented to person, place, and time. He appears well-developed.   HENT:   Head: Normocephalic and atraumatic.   Mouth/Throat: Oropharynx is clear and moist. No oropharyngeal exudate.   Eyes: Conjunctivae are normal. Pupils are equal, round, and reactive to light. Right eye  exhibits no discharge. Left eye exhibits no discharge. No scleral icterus.   Neck: Neck supple. No JVD present. No tracheal deviation present.   Cardiovascular: Normal rate and regular rhythm.  Exam reveals no gallop and no friction rub.    No murmur heard.  Pulmonary/Chest: Effort normal and breath sounds normal. No stridor. No respiratory distress. He has no wheezes. He exhibits no tenderness.   Abdominal: Soft. Bowel sounds are normal. He exhibits no distension. There is no tenderness. There is no rebound.   Musculoskeletal: He exhibits no edema or tenderness.   Neurological: He is alert and oriented to person, place, and time. No cranial nerve deficit. He exhibits normal muscle tone.   Skin: Skin is warm and dry. He is not diaphoretic. No cyanosis. Nails show no clubbing.   Psychiatric: He has a normal mood and affect. His behavior is normal.   Nursing note and vitals reviewed.      Recent Labs      07/10/17   1904  07/11/17   0315  07/11/17   1050   WBC  19.6*  13.0*   --    RBC  4.57*  3.25*   --    HEMOGLOBIN  13.8*  9.9*  11.3*   HEMATOCRIT  41.2*  31.3*   --    MCV  90.2  96.9   --    MCH  30.2  30.2   --    MCHC  33.5*  31.1*   --    RDW  44.2  49.3   --    PLATELETCT  488*  272   --    MPV  9.3  9.1   --      Recent Labs      07/11/17   0440  07/11/17   0710  07/11/17   1050   SODIUM  136  136  136   POTASSIUM  4.2  4.1  4.4   CHLORIDE  111  114*  112   CO2  15*  15*  17*   GLUCOSE  206*  155*  185*   BUN  15  13  11   CREATININE  0.97  0.97  0.90   CALCIUM  8.6  8.1*  7.9*                      Assessment/Plan     GI bleed  Assessment & Plan  Query gastritis /PUD from excessive NSAIDS  Continue protonix  Clinically bleeding resolved  GI following     DKA (diabetic ketoacidoses) (CMS-[facility])  Assessment & Plan  Continue insulin drip and monitor cbg's and electrolytes  Will likely need insulin for diabetes control Hba1c 11.7    Headache  Assessment & Plan  Get CT results from outlying facility  Avoid  nsaids    Anemia associated with acute blood loss  Assessment & Plan  Monitor hg transfuse if <7 or hypotension    Dyslipidemia  Assessment & Plan  simvaststain    Leukocytosis  Assessment & Plan  Likely reactive improved      >Patient is critically ill.   >The patient is having :DKA  >The vital organ system that is effected is the:endocrine  >If untreated there is a high chance of deterioration into: death  >The critical care that I am providing today is: insulin drip fluids  >The critical care that has been undertaken is medically complex.   >There has been no overlap in critical care time.   Critical care time not including procedures, no overlap: 33 minutes     Labs reviewed, Medications reviewed and Radiology images reviewed  Abrams catheter: No Abrams      DVT Prophylaxis: Contraindicated - High bleeding risk  DVT prophylaxis - mechanical: SCDs

## 2017-07-11 NOTE — PROGRESS NOTES
Report received from REGGIE Shaikh. Patient assessed and lines verified. VSS in ST and patient resting comfortably on room air. Patient transferred from [place] [address] to R1 on monitor with chart, meds, and belongings via gurney with ER tech, ALCS RN and 2 [facility]itentiary guards. Upon arrival to unit, patient self transferred to ICU bed. CHG bath given, linens changed and skin assessed. Patient oriented to surroundings, staff, routine and call light system. POC discussed with patient and communication board updated. Bed low and locked with alarm on and call light within reach. All needs currently being met.

## 2017-07-11 NOTE — PROGRESS NOTES
Gastroenterology Progress Note     Author: Hakeem Bangura   Date & Time Created: 7/10/2017 9:19 PM    Interval History:  Consult note  62 y/o male transferred from outside hospital for DKA and emesis. The patient reports vomiting over the last 3 days. During this time he has vomited 4-5 times a day. The emesis has ranged from clear, to black to frankly bloody. The amount of true hematemesis appears to be small. The last episode of emesis was coffee ground here in the ED. He describes one bout of black formed stool 3 days ago. There have been no stools since. He denies reflux symptoms. He does descirbes heavy NSAID use with up to 1-012 naproxen 2-3 times a day for headaches.     Pmhx  1) DM  2 htn  3) dyslipidemia  4) chronic headaches  5) hcv- s/p SVR with Harvoni     Pshx  1) cholecystectomy  All-nka    Meds  Metformin   Lisinopril  zocor  ?asprin    Soc  -former IVDA, quit years ago  -tob when possible  -no etoh    Fhx-  3 kids     Review of Systems:  Review of Systems   Constitutional: Positive for malaise/fatigue and diaphoresis.   HENT: Negative.    Eyes: Negative.    Respiratory: Negative.    Gastrointestinal: Positive for nausea and vomiting. Negative for abdominal pain, diarrhea and constipation.   Genitourinary: Negative for dysuria, urgency, frequency and hematuria.   Musculoskeletal: Negative.        Physical Exam:  Physical Exam   Constitutional: He is oriented to person, place, and time. He appears well-developed.   HENT:   Head: Normocephalic.   Eyes: Pupils are equal, round, and reactive to light.   Neck: Normal range of motion.   Cardiovascular:   Tachy, regular, no murmur   Pulmonary/Chest:   Tachypnea, clear   Abdominal: Soft. Bowel sounds are normal. He exhibits no distension. There is no tenderness. There is no rebound.   Musculoskeletal: He exhibits no edema.   Neurological: He is alert and oriented to person, place, and time.       Labs:        Invalid input(s): [id number]      Recent  Labs      07/10/17   1904   SODIUM  133*   POTASSIUM  4.7   CHLORIDE  106   CO2  10*   BUN  23*   CREATININE  1.27   CALCIUM  9.0     Recent Labs      07/10/17   1904   ALTSGPT  <5   ASTSGOT  6*   ALKPHOSPHAT  64   TBILIRUBIN  0.4   GLUCOSE  294*     Recent Labs      07/10/17   1904   RBC  4.57*   HEMOGLOBIN  13.8*   HEMATOCRIT  41.2*   PLATELETCT  488*     Recent Labs      07/10/17   1904   WBC  19.6*   NEUTSPOLYS  89.40*   LYMPHOCYTES  3.50*   MONOCYTES  4.40   EOSINOPHILS  0.00   BASOPHILS  0.90   ASTSGOT  6*   ALTSGPT  <5   ALKPHOSPHAT  64   TBILIRUBIN  0.4     Hemodynamics:  Temp (24hrs), Av.4 °C (97.6 °F), Min:36.4 °C (97.6 °F), Max:36.4 °C (97.6 °F)  Temperature: 36.4 °C (97.6 °F)  Pulse  Av  Min: 111  Max: 111   Blood Pressure: 119/78 mmHg     Respiratory:    Respiration: 18, Pulse Oximetry: 97 %           Fluids:  No intake or output data in the 24 hours ending 07/10/17 2119  Weight: 77.111 kg (170 lb)  GI/Nutrition:  Orders Placed This Encounter   Procedures   • Diet NPO     Standing Status: Standing      Number of Occurrences: 1      Standing Expiration Date:      Order Specific Question:  Restrict to:     Answer:  Sips with Medications [3]     Medical Decision Making, by Problem:  Active Hospital Problems    Diagnosis   • DKA (diabetic ketoacidoses) (CMS-HCC) [E13.10]   2) renal insufficiency  3) GIB- PUD vs MWT, vs gatritis/reflux  4) leukocytosis likely from DKA    Plan:  Admit  ivf  Insulin drip  Keep npo   PPI IV  Eventual EGD after DKA resolves-r/b/a discussed  Follow CBC      Core Measures

## 2017-07-11 NOTE — PROGRESS NOTES
Discussed 0300 BMP results wit Dr. Payan. Order received to redraw BMP and run STAT, continue with 2L NS bolus but d/c  ml/hr and restart D5NS at 100 ml/hr, and keep insulin at 5 units/hr. Order read back and verified.

## 2017-07-11 NOTE — H&P
PATIENT ID:  NAME:  Marge Frye  MRN:               [id number]  YOB: 1955    PMD: Pcp Pt States None    CC: Transferred from [facility] facility for hyperglycemia and hematemesis    HPI: Marge Frye is a 61 y.o. male who is currently incarcerated in [facility] and presents after an episode of hematemesis at the retirement with noted elevated blood sugars. The patient reports that he has had nausea and vomiting for the last 3 days. He was continued on his home metformin. He had apparently been taking multiple doses of NSAIDs for the last 3 months because of a headache. He was taking up to 12 naproxen tablets every 6 hours. Today, the patient was treated in the retirement medical facility when hematemesis was noted and his Hemoccult stool was positive. He was also noted to have significantly elevated blood sugars and [facility] Upon arrival in our hospital, the patient is noted to be hyperglycemic with low bicarb and elevated anion gap. He will be admitted for further treatment and evaluation. He denies any fevers, chills, chest pain, or shortness of breath. He does have a headache, abdominal pain, but no diarrhea or dysuria.                REVIEW OF SYSTEMS  A full review of systems was completed and all pertinent positives and negatives are included in the HPI above by AMA/CMS criteria.    PAST MEDICAL HISTORY  Past Medical History   Diagnosis Date   • Hypertension    • Hepatitis C    • Hyperlipemia    • Hyperglycemia        PAST SURGICAL HISTORY  History reviewed. No pertinent past surgical history.    FAMILY HISTORY  History reviewed. No pertinent family history.    SOCIAL HISTORY  Social History   Substance Use Topics   • Smoking status: Former Smoker   • Smokeless tobacco: Not on file   • Alcohol Use: No       ALLERGIES  Allergies as of 07/10/2017   • (No Known Allergies)       OUTPATIENT MEDICATIONS     Medication List      ASK your doctor about  "these medications       Instructions    aspirin EC 81 MG Tbec   Commonly known as:  ECOTRIN    Take 81 mg by mouth every day.   Dose:  81 mg       lisinopril 10 MG Tabs   Commonly known as:  PRINIVIL    Take 10 mg by mouth every day.   Dose:  10 mg       metformin 850 MG Tabs   Commonly known as:  GLUCOPHAGE    Take 850 mg by mouth 3 times a day.   Dose:  850 mg       simvastatin 10 MG Tabs   Commonly known as:  ZOCOR    Take 10 mg by mouth every bedtime.   Dose:  10 mg             PHYSICAL EXAM:  Blood pressure 119/78, pulse 111, temperature 36.4 °C (97.6 °F), resp. rate 18, height 1.854 m (6' 1\"), weight 77.111 kg (170 lb), SpO2 97 %.  Oxygen: Pulse Oximetry: 97 %, O2 Delivery: None (Room Air)    Gen: NAD, comfortable  HEENT: NCAT, EOMI, no LAD, no JVD, neck supple, MMM, no supraclavicular lymphadenopathy  Heart: +S1/S2, RRR, no murmur, rubs or gallops appreciated  Lungs: CTAB, no accessory muscle use, no wheezes, rubs or rhonchi heard  GI: NTND, soft, +BS, no rebound/guarding, no hepatosplenomegaly  Extremities: Warm, well-perfused, no cyanosis/clubbing, no peripheral edema, distal pulses are intact  MSK: 5/5 strength in all 4 extremities, sensation intact to light touch  Neuro: AO x 3, CN II-XII grossly intact    LAB TESTS and IMAGES:   Recent Labs      07/10/17   1904   WBC  19.6*   RBC  4.57*   HEMOGLOBIN  13.8*   HEMATOCRIT  41.2*   MCV  90.2   MCH  30.2   RDW  44.2   PLATELETCT  488*   MPV  9.3   NEUTSPOLYS  89.40*   LYMPHOCYTES  3.50*   MONOCYTES  4.40   EOSINOPHILS  0.00   BASOPHILS  0.90   RBCMORPHOLO  Present             Recent Labs      07/10/17   1904   SODIUM  133*   POTASSIUM  4.7   CHLORIDE  106   CO2  10*   BUN  23*   CREATININE  1.27   CALCIUM  9.0   ALBUMIN  3.4     Estimated GFR/CRCL = Estimated Creatinine Clearance: 67.5 mL/min (by C-G formula based on Cr of 1.27).  Recent Labs      07/10/17   1904   GLUCOSE  294*     No results found for: HBA1C, TSH, FREET4, FREET3, CORTISOL  Recent Labs      " 07/10/17   1904   ASTSGOT  6*   ALTSGPT  <5   TBILIRUBIN  0.4   ALKPHOSPHAT  64   GLOBULIN  3.5           Invalid input(s): [id number]  No results found for: [id number], FOLATE, FERRITIN, IRON, TOTIRONBC  No results found.    ASSESSMENT/PLAN: Marge Frye is a 61 y.o. male who was transferred from a group home medical facility for DKA and GI bleed    1. DKA: The patient will be admitted to the ICU on the DKA protocol. He still has low CO2 and elevated anion gap although his sugars have improved somewhat. He has received 1 L of fluid in the ER and I will add an additional 2 L at which point I will consider him fluid resuscitated. He will then be transitioned to maintenance fluids. He has been started on an insulin drip and we will monitor BMPs every 4 hours until his gap closes.     2. Hyponatremia: This is mild and suspect is factitious secondary to the above. Expect improvement with fluid correction.    3. GI bleed: Suspect upper GI in light of his abdominal pain and significant NSAID use in the last several months. He will be kept nothing by mouth for bowel rest. I have continued him on IV proton pump inhibitor. We will check every 6 hours hemoglobin levels and plan to transfuse for hemoglobin less than 7. Currently his hemoglobin level is greater than 12 and he is hemodynamically stable. Dr. Bangura of gastroenterology has been consulted and I look for to his recommendations.    4. Hypertension: Patient's blood pressure is currently stable, okay to continue home lisinopril    5. Hyperlipidemia: Continue home simvastatin  PPX: Sequential compression devices for DVT prophylaxis, no PPI indicated, stool softeners ordered    DISPO: ICU    CODE STATUS: Full    Anticipate that patient will need greater than 2 midnights for management of the above discussed medical issues.    This patient is critically ill, greater than 36 minutes of critical care time were spent in the admission planning and management of this patient  without procedures and no overlap    This dictation was created using voice recognition software. The accuracy of the dictation is limited to the abilities of the software. I expect there may be some errors of grammar and possibly content.

## 2017-07-11 NOTE — ED NOTES
Pt lg harden from [facility].  Chief Complaint   Patient presents with   • Blood in Vomit   • High Blood Sugar     Transfer for GI bleed and DKA.  Pt arrives AOx4. C/o abd pain. Last PTA FS @8173=875.  Pt arrives on an insulin and protonix drip.  Pt in a gown. Connected to monitor.

## 2017-07-11 NOTE — ASSESSMENT & PLAN NOTE
Discussed with Dr. Desai  temporal artery duplex normal, he does not feel that biopsy is warranted  Will d/c prednisone  Given mastoid fluids and decrease hearing will empirically treat with augmentin

## 2017-07-11 NOTE — DIETARY
Nutrition Services     Pt noted with nutrition admit screen triggers: poor po and unplanned wt loss PTA     Pt is a 60 yo male with adm dx: DKA (diabetic ketoacidoses)  Past Medical History   Diagnosis Date   • Hypertension    • Hepatitis C    • Hyperlipemia    • Hyperglycemia      Pt is an incarcerated male that appears well nourished. He is currently NPO d/t DKA protocol.      Wt: (7/10) 79.5 kg, bed scale   BMI: 23.7  Pertinent labs: Gluc 185, HgA1c 11.7, CO2 17, Anion Gap 7  Pertinent meds: K+ scale per DKA protocol, Folic Acid, Insulin, Protonix, Thiamine   Fluids: IVF D5 at 100 mL/hr (which is providing 408 kcals/day),  NS at 100 mL/hr   Skin: no skin breakdown noted at this time   GI: per ADLs, last BM PTA     Recommendations:   · Provide DM diet once pt is able  · DM diet education is not appropriate as this pt will be provided DM meals once he returns to long term   · Monitor wt and lab trends     RD following

## 2017-07-11 NOTE — PROGRESS NOTES
MD notified of marked increase in FSBS at 0313 to 464 from 263 at 0223 as well as decrease in CO2 from 10 to 9 at 2245 and increase in AG from 17 to 18 as well. 0300 BMP labs still pending. Order received to stop D5NS, give 2L NS bolus, increase insulin gtt from 4 units/hr to 5 units/hr and to go back up the DKA order set flow chart to the second tier of the flow sheet. Order read back and verified.  Patient also complaining of unrelieved headache despite rest, cold pack, reposition and other non-pharmacological techniques. Order received for morphine 1-2 mg q4 hrs for severe pain PRN.

## 2017-07-11 NOTE — ED PROVIDER NOTES
ED Provider Note    Scribed for Leroy Brennan M.D. by Tacos Bae. 7/10/2017, 7:12 PM.    Means of arrival: EMS  History obtained from: Patient  History limited by: None    CHIEF COMPLAINT  Chief Complaint   Patient presents with   • Blood in Vomit   • High Blood Sugar       HPI  Nine Melva is a 61 y.o. male with a history of diabetes mellitus who presents to the Emergency Department as a transfer from [place] after he began vomiting blood at the shelter where he is [facility]. He reports that his vomiting onset three days ago. Patient was administered metformin, but no insulin. Doctors at [place] noticed blood in vomitus and his hemoccult was positive. Patient received a CT abdomen at [place]. They began treating for DKA and then he was sent here because [facility] don't have ICU. Patient last vomited today while a [place]. Patient has been non compliant with his diabetes medication since the vomiting onset. Patient reports no PO solid intake for the since the vomiting onset. Per nursing notes, the patient arrived on an insulin and protonix IV. Patient reports a headache onset 90 days ago that has been constant, began taking naprosyn. Patinet states that he does not have headaches normally. Patient began to elevate his dosage to around 12 naprosyn every six hours. The patient's headache is still not alleviated. Patient reports assoicated abdominal pain.    Transfer labs from [place]: CBC with an elevated WBC of 21.1, H&H normal,  platelets 566, CMP glucose 430, creatinine kinase 1.3, potassium 5.5, GAP 28, and  bicarbonate 8.    REVIEW OF SYSTEMS  Review of Systems   Gastrointestinal: Positive for vomiting (with blood) and blood in stool.        Positive for decreased PO solid intake   Neurological: Positive for headaches.   Endo/Heme/Allergies:        Positive for hyperglycemia   All other systems reviewed and are negative.  C    PAST MEDICAL HISTORY   has a past medical history of  "Hypertension; Hepatitis C; Hyperlipemia; and Hyperglycemia.    SURGICAL HISTORY  patient denies any surgical history    SOCIAL HISTORY  Social History   Substance Use Topics   • Smoking status: Former Smoker   • Smokeless tobacco: None   • Alcohol Use: No      History   Drug Use No       FAMILY HISTORY  History reviewed. No pertinent family history.    CURRENT MEDICATIONS  Home Medications     Reviewed by Kj Brady (Pharmacy Tech) on 07/10/17 at 2017  Med List Status: Complete    Medication Last Dose Status    aspirin EC (ECOTRIN) 81 MG Tablet Delayed Response 7/10/2017 Active    lisinopril (PRINIVIL) 10 MG Tab 7/10/2017 Active    metformin (GLUCOPHAGE) 850 MG Tab 7/10/2017 Active    simvastatin (ZOCOR) 10 MG Tab 7/9/2017 Active                ALLERGIES  No Known Allergies    PHYSICAL EXAM  VITAL SIGNS: /78 mmHg  Pulse 111  Temp(Src) 36.4 °C (97.6 °F)  Resp 18  Ht 1.854 m (6' 1\")  Wt 77.111 kg (170 lb)  BMI 22.43 kg/m2  SpO2 97%    Constitutional: Well developed, Well nourished, No acute distress, Non-toxic appearance.   HENT: Normocephalic, Atraumatic, Bilateral external ears normal, Dry mucous membranes., No oral exudates, Nose normal.   Eyes: Pupils are equal round and react to light, extraocular motions are intact, conjunctiva is normal, there are no signs of exudate.   Neck: Supple, no meningeal signs.  Lymphatic: No lymphadenopathy noted.   Cardiovascular: Regular rate and rhythm without murmurs gallops or rubs.   Thorax & Lungs: No respiratory distress. Breathing comfortably. Diminished bilaterally, but clear throughout. there are no wheezes no rales. Chest wall is nontender.  Abdomen: Soft, Mild epigastric tenderness, nondistended. Bowel sounds are present.   Skin: Warm, Dry, No erythema,   Back: No tenderness, No CVA tenderness.  Musculoskeletal: Good range of motion in all major joints. No tenderness to palpation or major deformities noted. Intact distal pulses, no clubbing, no " cyanosis, no edema,   Neurologic: Alert & oriented x 3, Moving all extremities. No gross abnormalities.    Psychiatric: Affect normal, Judgment normal, Mood normal.     LABS  Results for orders placed or performed during the hospital encounter of 07/10/17   CBC WITH DIFFERENTIAL   Result Value Ref Range    WBC 19.6 (H) 4.8 - 10.8 K/uL    RBC 4.57 (L) 4.70 - 6.10 M/uL    Hemoglobin 13.8 (L) 14.0 - 18.0 g/dL    Hematocrit 41.2 (L) 42.0 - 52.0 %    MCV 90.2 81.4 - 97.8 fL    MCH 30.2 27.0 - 33.0 pg    MCHC 33.5 (L) 33.7 - 35.3 g/dL    RDW 44.2 35.9 - 50.0 fL    Platelet Count 488 (H) 164 - 446 K/uL    MPV 9.3 9.0 - 12.9 fL    Nucleated RBC 0.00 /100 WBC    NRBC (Absolute) 0.00 K/uL    Neutrophils-Polys 89.40 (H) 44.00 - 72.00 %    Lymphocytes 3.50 (L) 22.00 - 41.00 %    Monocytes 4.40 0.00 - 13.40 %    Eosinophils 0.00 0.00 - 6.90 %    Basophils 0.90 0.00 - 1.80 %    Neutrophils (Absolute) 17.70 (H) 1.82 - 7.42 K/uL    Lymphs (Absolute) 0.69 (L) 1.00 - 4.80 K/uL    Monos (Absolute) 0.86 (H) 0.00 - 0.85 K/uL    Eos (Absolute) 0.00 0.00 - 0.51 K/uL    Baso (Absolute) 0.18 (H) 0.00 - 0.12 K/uL    Anisocytosis 1+     Microcytosis 1+    COMP METABOLIC PANEL   Result Value Ref Range    Sodium 133 (L) 135 - 145 mmol/L    Potassium 4.7 3.6 - 5.5 mmol/L    Chloride 106 96 - 112 mmol/L    Co2 10 (L) 20 - 33 mmol/L    Anion Gap 17.0 (H) 0.0 - 11.9    Glucose 294 (H) 65 - 99 mg/dL    Bun 23 (H) 8 - 22 mg/dL    Creatinine 1.27 0.50 - 1.40 mg/dL    Calcium 9.0 8.5 - 10.5 mg/dL    AST(SGOT) 6 (L) 12 - 45 U/L    ALT(SGPT) <5 2 - 50 U/L    Alkaline Phosphatase 64 30 - 99 U/L    Total Bilirubin 0.4 0.1 - 1.5 mg/dL    Albumin 3.4 3.2 - 4.9 g/dL    Total Protein 6.9 6.0 - 8.2 g/dL    Globulin 3.5 1.9 - 3.5 g/dL    A-G Ratio 1.0 g/dL   ESTIMATED GFR   Result Value Ref Range    GFR If African American >60 >60 mL/min/1.73 m 2    GFR If Non African American 58 (A) >60 mL/min/1.73 m 2   COD (Adult)   Result Value Ref Range    ABO Grouping Only  O     Rh Grouping Only POS     Antibody Screen-Cod NEG    DIFFERENTIAL MANUAL   Result Value Ref Range    Bands-Stabs 0.90 0.00 - 10.00 %    Myelocytes 0.90 %    Manual Diff Status PERFORMED    PERIPHERAL SMEAR REVIEW   Result Value Ref Range    Peripheral Smear Review see below    PLATELET ESTIMATE   Result Value Ref Range    Plt Estimation Normal    MORPHOLOGY   Result Value Ref Range    RBC Morphology Present      All labs reviewed by me.    Transfer labs from [facility]: CBC with an elevated WBC of 21.1, H&H normal,  platelets 566, CMP glucose 430, creatinine kinase 1.3, potassium 5.5, GAP 28, and  bicarbonate 8.    RADIOLOGY  No orders to display     The radiologist's interpretation of all radiological studies have been reviewed by me.    COURSE & MEDICAL DECISION MAKING  Pertinent Labs & Imaging studies reviewed. (See chart for details)    7:12 PM - Patient seen and examined at bedside. Ordered CMP and CBC with differential to evaluate his symptoms. The differential diagnoses include but are not limited to: Upper GI bleed secondary to NSAID use and DKA     7:29 PM Paged Hospitalist.    7:32 PM I discussed the patient's case and the above findings with Dr. Payan (Hospitalist) who agrees to admit the patient.    7:49 PM Patient will be treated with Protonix 80 mg drip in  ml infusion.     CRITICAL CARE  The very real possibilty of a deterioration of this patient's condition required the highest level of my preparedness for sudden, emergent intervention.  I provided critical care services, which included continuous Protonix drip, frequent reevaluations of the patient's condition and response to treatment, ordering and reviewing test results, and discussing the case with various consultants.  The critical care time associated with the care of the patient was 33 minutes. This time is exclusive of any other billable procedures.     Decision Making:  Is presents for evaluation as a transfer. At this point. He  is otherwise stable. I will continue on a Protonix drip. I have stopped the insulin infusion physician. Ears have come down almost to fast. We will admit the patient for further treatment and care. I have started the patient 5 her cc bolus. I spoke with Dr. Bangura on GI as well as Dr. Coates, the hospitalist will admit the patient for further treatment and care.    DISPOSITION:  Patient will be admitted to Dr. Payan, Hospitalist, in critical condition.     FINAL IMPRESSION  1. Diabetic ketoacidosis without coma associated with type 1 diabetes mellitus (CMS-HCC)    2. Upper GI bleed         Total Critical Care of 33 minutes, as outlined above.     Tacos SAMPSON (Scribe), am scribing for, and in the presence of, Leroy Brennan M.D..    Electronically signed by: Tacos Bae (Scribe), 7/10/2017    Leroy SAMPSON M.D. personally performed the services described in this documentation, as scribed by Tacos Bae in my presence, and it is both accurate and complete.    The note accurately reflects work and decisions made by me.  Leroy Brennan  7/10/2017  9:47 PM

## 2017-07-12 ENCOUNTER — APPOINTMENT (OUTPATIENT)
Dept: RADIOLOGY | Facility: MEDICAL CENTER | Age: 62
DRG: 380 | End: 2017-07-12
Attending: HOSPITALIST
Payer: COMMERCIAL

## 2017-07-12 PROBLEM — E11.9 TYPE 2 DIABETES MELLITUS WITHOUT COMPLICATION (HCC): Status: ACTIVE | Noted: 2017-07-12

## 2017-07-12 PROBLEM — E87.1 HYPONATREMIA: Status: ACTIVE | Noted: 2017-07-12

## 2017-07-12 LAB
ANION GAP SERPL CALC-SCNC: 5 MMOL/L (ref 0–11.9)
ANION GAP SERPL CALC-SCNC: 7 MMOL/L (ref 0–11.9)
ANISOCYTOSIS BLD QL SMEAR: ABNORMAL
BASOPHILS # BLD AUTO: 0 % (ref 0–1.8)
BASOPHILS # BLD: 0 K/UL (ref 0–0.12)
BUN SERPL-MCNC: 6 MG/DL (ref 8–22)
BUN SERPL-MCNC: 7 MG/DL (ref 8–22)
CALCIUM SERPL-MCNC: 8.3 MG/DL (ref 8.5–10.5)
CALCIUM SERPL-MCNC: 8.3 MG/DL (ref 8.5–10.5)
CHLORIDE SERPL-SCNC: 102 MMOL/L (ref 96–112)
CHLORIDE SERPL-SCNC: 105 MMOL/L (ref 96–112)
CO2 SERPL-SCNC: 19 MMOL/L (ref 20–33)
CO2 SERPL-SCNC: 19 MMOL/L (ref 20–33)
CREAT SERPL-MCNC: 0.76 MG/DL (ref 0.5–1.4)
CREAT SERPL-MCNC: 0.83 MG/DL (ref 0.5–1.4)
EOSINOPHIL # BLD AUTO: 0.71 K/UL (ref 0–0.51)
EOSINOPHIL NFR BLD: 5.2 % (ref 0–6.9)
ERYTHROCYTE [DISTWIDTH] IN BLOOD BY AUTOMATED COUNT: 41.4 FL (ref 35.9–50)
GFR SERPL CREATININE-BSD FRML MDRD: >60 ML/MIN/1.73 M 2
GFR SERPL CREATININE-BSD FRML MDRD: >60 ML/MIN/1.73 M 2
GLUCOSE BLD-MCNC: 146 MG/DL (ref 65–99)
GLUCOSE BLD-MCNC: 152 MG/DL (ref 65–99)
GLUCOSE BLD-MCNC: 214 MG/DL (ref 65–99)
GLUCOSE BLD-MCNC: 229 MG/DL (ref 65–99)
GLUCOSE BLD-MCNC: 233 MG/DL (ref 65–99)
GLUCOSE BLD-MCNC: 268 MG/DL (ref 65–99)
GLUCOSE SERPL-MCNC: 153 MG/DL (ref 65–99)
GLUCOSE SERPL-MCNC: 198 MG/DL (ref 65–99)
HCT VFR BLD AUTO: 33.1 % (ref 42–52)
HGB BLD-MCNC: 11.2 G/DL (ref 14–18)
HGB BLD-MCNC: 11.4 G/DL (ref 14–18)
HGB BLD-MCNC: 12.7 G/DL (ref 14–18)
LYMPHOCYTES # BLD AUTO: 1.17 K/UL (ref 1–4.8)
LYMPHOCYTES NFR BLD: 8.6 % (ref 22–41)
MANUAL DIFF BLD: NORMAL
MCH RBC QN AUTO: 29.6 PG (ref 27–33)
MCHC RBC AUTO-ENTMCNC: 33.8 G/DL (ref 33.7–35.3)
MCV RBC AUTO: 87.6 FL (ref 81.4–97.8)
MICROCYTES BLD QL SMEAR: ABNORMAL
MONOCYTES # BLD AUTO: 0.12 K/UL (ref 0–0.85)
MONOCYTES NFR BLD AUTO: 0.9 % (ref 0–13.4)
MORPHOLOGY BLD-IMP: NORMAL
NEUTROPHILS # BLD AUTO: 11.6 K/UL (ref 1.82–7.42)
NEUTROPHILS NFR BLD: 85.3 % (ref 44–72)
NRBC # BLD AUTO: 0 K/UL
NRBC BLD AUTO-RTO: 0 /100 WBC
PLATELET # BLD AUTO: 297 K/UL (ref 164–446)
PLATELET BLD QL SMEAR: NORMAL
PMV BLD AUTO: 9.3 FL (ref 9–12.9)
POIKILOCYTOSIS BLD QL SMEAR: NORMAL
POTASSIUM SERPL-SCNC: 3.8 MMOL/L (ref 3.6–5.5)
POTASSIUM SERPL-SCNC: 3.8 MMOL/L (ref 3.6–5.5)
RBC # BLD AUTO: 3.78 M/UL (ref 4.7–6.1)
RBC BLD AUTO: PRESENT
SODIUM SERPL-SCNC: 126 MMOL/L (ref 135–145)
SODIUM SERPL-SCNC: 130 MMOL/L (ref 135–145)
SODIUM SERPL-SCNC: 131 MMOL/L (ref 135–145)
SPHEROCYTES BLD QL SMEAR: NORMAL
WBC # BLD AUTO: 13.6 K/UL (ref 4.8–10.8)

## 2017-07-12 PROCEDURE — 700105 HCHG RX REV CODE 258: Performed by: HOSPITALIST

## 2017-07-12 PROCEDURE — 700117 HCHG RX CONTRAST REV CODE 255: Performed by: HOSPITALIST

## 2017-07-12 PROCEDURE — 700111 HCHG RX REV CODE 636 W/ 250 OVERRIDE (IP): Performed by: HOSPITALIST

## 2017-07-12 PROCEDURE — 85027 COMPLETE CBC AUTOMATED: CPT

## 2017-07-12 PROCEDURE — 82962 GLUCOSE BLOOD TEST: CPT | Mod: 91

## 2017-07-12 PROCEDURE — 700102 HCHG RX REV CODE 250 W/ 637 OVERRIDE(OP): Performed by: HOSPITALIST

## 2017-07-12 PROCEDURE — C9113 INJ PANTOPRAZOLE SODIUM, VIA: HCPCS | Performed by: HOSPITALIST

## 2017-07-12 PROCEDURE — A9270 NON-COVERED ITEM OR SERVICE: HCPCS | Performed by: HOSPITALIST

## 2017-07-12 PROCEDURE — 70553 MRI BRAIN STEM W/O & W/DYE: CPT

## 2017-07-12 PROCEDURE — 80048 BASIC METABOLIC PNL TOTAL CA: CPT

## 2017-07-12 PROCEDURE — 85018 HEMOGLOBIN: CPT | Mod: 91

## 2017-07-12 PROCEDURE — 99233 SBSQ HOSP IP/OBS HIGH 50: CPT | Performed by: HOSPITALIST

## 2017-07-12 PROCEDURE — 85007 BL SMEAR W/DIFF WBC COUNT: CPT

## 2017-07-12 PROCEDURE — 84295 ASSAY OF SERUM SODIUM: CPT

## 2017-07-12 PROCEDURE — A9579 GAD-BASE MR CONTRAST NOS,1ML: HCPCS | Performed by: HOSPITALIST

## 2017-07-12 PROCEDURE — 770001 HCHG ROOM/CARE - MED/SURG/GYN PRIV*

## 2017-07-12 RX ORDER — INSULIN GLARGINE 100 [IU]/ML
15 INJECTION, SOLUTION SUBCUTANEOUS EVERY EVENING
Status: DISCONTINUED | OUTPATIENT
Start: 2017-07-12 | End: 2017-07-13

## 2017-07-12 RX ORDER — POTASSIUM CHLORIDE 7.45 MG/ML
10 INJECTION INTRAVENOUS ONCE
Status: COMPLETED | OUTPATIENT
Start: 2017-07-12 | End: 2017-07-12

## 2017-07-12 RX ORDER — INSULIN GLARGINE 100 [IU]/ML
5 INJECTION, SOLUTION SUBCUTANEOUS ONCE
Status: COMPLETED | OUTPATIENT
Start: 2017-07-12 | End: 2017-07-12

## 2017-07-12 RX ORDER — ONDANSETRON 2 MG/ML
4 INJECTION INTRAMUSCULAR; INTRAVENOUS EVERY 4 HOURS PRN
Status: DISCONTINUED | OUTPATIENT
Start: 2017-07-12 | End: 2017-07-18 | Stop reason: HOSPADM

## 2017-07-12 RX ORDER — INSULIN GLARGINE 100 [IU]/ML
10 INJECTION, SOLUTION SUBCUTANEOUS EVERY EVENING
Status: DISCONTINUED | OUTPATIENT
Start: 2017-07-12 | End: 2017-07-12

## 2017-07-12 RX ORDER — SODIUM CHLORIDE 9 MG/ML
2000 INJECTION, SOLUTION INTRAVENOUS CONTINUOUS
Status: ACTIVE | OUTPATIENT
Start: 2017-07-12 | End: 2017-07-12

## 2017-07-12 RX ADMIN — OXYCODONE HYDROCHLORIDE 10 MG: 10 TABLET ORAL at 08:46

## 2017-07-12 RX ADMIN — POTASSIUM CHLORIDE 10 MEQ: 7.46 INJECTION, SOLUTION INTRAVENOUS at 01:40

## 2017-07-12 RX ADMIN — STANDARDIZED SENNA CONCENTRATE AND DOCUSATE SODIUM 2 TABLET: 8.6; 5 TABLET, FILM COATED ORAL at 08:47

## 2017-07-12 RX ADMIN — INSULIN GLARGINE 15 UNITS: 100 INJECTION, SOLUTION SUBCUTANEOUS at 20:27

## 2017-07-12 RX ADMIN — SODIUM CHLORIDE 1000 ML: 9 INJECTION, SOLUTION INTRAVENOUS at 14:14

## 2017-07-12 RX ADMIN — LISINOPRIL 10 MG: 10 TABLET ORAL at 08:46

## 2017-07-12 RX ADMIN — PANTOPRAZOLE SODIUM 40 MG: 40 INJECTION, POWDER, FOR SOLUTION INTRAVENOUS at 08:46

## 2017-07-12 RX ADMIN — OXYCODONE HYDROCHLORIDE 10 MG: 10 TABLET ORAL at 19:08

## 2017-07-12 RX ADMIN — OXYCODONE HYDROCHLORIDE 10 MG: 10 TABLET ORAL at 12:50

## 2017-07-12 RX ADMIN — Medication 100 MG: at 08:47

## 2017-07-12 RX ADMIN — POTASSIUM CHLORIDE 10 MEQ: 7.46 INJECTION, SOLUTION INTRAVENOUS at 02:40

## 2017-07-12 RX ADMIN — GADODIAMIDE 20 ML: 287 INJECTION INTRAVENOUS at 15:23

## 2017-07-12 RX ADMIN — ONDANSETRON 4 MG: 2 INJECTION INTRAMUSCULAR; INTRAVENOUS at 12:11

## 2017-07-12 RX ADMIN — SODIUM CHLORIDE 1000 ML: 9 INJECTION, SOLUTION INTRAVENOUS at 03:50

## 2017-07-12 RX ADMIN — ONDANSETRON 4 MG: 2 INJECTION INTRAMUSCULAR; INTRAVENOUS at 02:29

## 2017-07-12 RX ADMIN — ONDANSETRON 4 MG: 2 INJECTION INTRAMUSCULAR; INTRAVENOUS at 07:43

## 2017-07-12 RX ADMIN — OXYCODONE HYDROCHLORIDE 5 MG: 5 TABLET ORAL at 04:02

## 2017-07-12 RX ADMIN — PANTOPRAZOLE SODIUM 40 MG: 40 INJECTION, POWDER, FOR SOLUTION INTRAVENOUS at 20:27

## 2017-07-12 RX ADMIN — INSULIN GLARGINE 10 UNITS: 100 INJECTION, SOLUTION SUBCUTANEOUS at 01:44

## 2017-07-12 RX ADMIN — SIMVASTATIN 10 MG: 10 TABLET, FILM COATED ORAL at 20:27

## 2017-07-12 RX ADMIN — INSULIN GLARGINE 5 UNITS: 100 INJECTION, SOLUTION SUBCUTANEOUS at 13:54

## 2017-07-12 RX ADMIN — FOLIC ACID 1 MG: 1 TABLET ORAL at 08:47

## 2017-07-12 ASSESSMENT — ENCOUNTER SYMPTOMS
PSYCHIATRIC NEGATIVE: 1
ORTHOPNEA: 0
HEMOPTYSIS: 0
HEADACHES: 1
FALLS: 0
EYE REDNESS: 0
EYE DISCHARGE: 0
SEIZURES: 0
WHEEZING: 0
BACK PAIN: 0
NECK PAIN: 1
SPEECH CHANGE: 0
PALPITATIONS: 0
SHORTNESS OF BREATH: 0
VOMITING: 0
FEVER: 0
NAUSEA: 0
FOCAL WEAKNESS: 0
CHILLS: 0
COUGH: 0
ABDOMINAL PAIN: 0
FLANK PAIN: 0
DIZZINESS: 0

## 2017-07-12 ASSESSMENT — PAIN SCALES - GENERAL
PAINLEVEL_OUTOF10: 3
PAINLEVEL_OUTOF10: 2
PAINLEVEL_OUTOF10: 5
PAINLEVEL_OUTOF10: 5
PAINLEVEL_OUTOF10: 2
PAINLEVEL_OUTOF10: 2
PAINLEVEL_OUTOF10: 7
PAINLEVEL_OUTOF10: 2
PAINLEVEL_OUTOF10: 7

## 2017-07-12 ASSESSMENT — PATIENT HEALTH QUESTIONNAIRE - PHQ9
1. LITTLE INTEREST OR PLEASURE IN DOING THINGS: NOT AT ALL
SUM OF ALL RESPONSES TO PHQ9 QUESTIONS 1 AND 2: 0
2. FEELING DOWN, DEPRESSED, IRRITABLE, OR HOPELESS: NOT AT ALL
SUM OF ALL RESPONSES TO PHQ QUESTIONS 1-9: 0

## 2017-07-12 NOTE — ASSESSMENT & PLAN NOTE
With hyperglycemia, not controlled,   Increased cbg's due to to prednisone (stopped per above)  Increase NPH to 27 bid, ISS, monitor cbg's and adjust  Patient can receive insulin injection in custodial.

## 2017-07-12 NOTE — PROGRESS NOTES
Gastroenterology progress note        Author: Dr Silvia Warren ( resident)  Date & Time note created:    7/12/2017   1:50 PM     Visit Time:  9AM    Patient ID:   Name:             Marge Frye     YOB: 1955  Age:                 61 y.o.  male   MRN:               [id number]    Attending: Dr. Den Gilmore  Resident: Dr. ALFA Warren                                                          -------------------------------------------------------------------------------------------------  ID:  Mr Frye is a 62yo M admitted for DKA, consulted for hemetemesis    Interval History:  No nausea, vomiting since yesterday. No signs of GI bleeding, abdominal pain. No BM since yday (asked to look for melena)    Review of Systems:      Constitutional: Denies fevers/chills  Cardiovascular: Denies chest pain or palpitations   Respiratory: Denies shortness of breath or cough  Gastrointestinal/Hepatic: Denies abdominal pain, nausea, vomiting, diarrhea, constipation  Genitourinary: Denies dysuria or frequency  Musculoskeletal/Rheum: Denies  joint or muscle pain  Neurological: Denies focal weakness/parasthesias  All other systems were reviewed and are negative (AMA/CMS criteria)              -------------------------------------------------------------------------------------------------  Physical Exam:  Vitals/ General Appearance:   Weight/BMI: Body mass index is 23.77 kg/(m^2).  Blood pressure 119/78, pulse 77, temperature 37.1 °C (98.7 °F), resp. rate 20, height 1.829 m (6'), weight 79.5 kg (175 lb 4.3 oz), SpO2 94 %.  Filed Vitals:    07/12/17 0800 07/12/17 0900 07/12/17 1000 07/12/17 1100   BP:       Pulse: 75 76 86 77   Temp:    37.1 °C (98.7 °F)   Resp: 14 15 15 20   Height:       Weight:       SpO2: 95% 96% 94% 94%     Oxygen Therapy:  Pulse Oximetry: 94 %, O2 (LPM): 0, O2 Delivery: None (Room  Air)    Constitutional: No acute distress, Non-toxic appearance.   HENMT:  NC/AT. MMM. No oral exudates.  Eyes:  PERRL, EOMI, Conjunctiva normal.   Cardiovascular:  Normal heart rate, Normal rhythm, No murmurs, No rubs, No gallops.    Extremities: no cyanosis or edema.  Lungs:  CTABL, no w/r/r. No crackles.   Abdomen: soft, NT, ND. +BS.  Skin: Warm, Dry, No erythema, No rash.  Neurologic: AAOx3, no focal deficits.   ---------------------------------------------------------------------------------------------------  Lab Data Review:  Recent Labs      07/10/17   2245   07/11/17   1600  07/12/17   0030  07/12/17   0400  07/12/17   1156   SODIUM  133*   < >  136  131*  126*  130*   POTASSIUM  5.1   < >  3.4*  3.8  3.8   --    CHLORIDE  106   < >  111  105  102   --    CO2  9*   < >  17*  19*  19*   --    BUN  21   < >  10  7*  6*   --    CREATININE  1.24   < >  0.79  0.83  0.76   --    MAGNESIUM  1.9   --    --    --    --    --    PHOSPHORUS  2.7   --    --    --    --    --    CALCIUM  9.0   < >  8.0*  8.3*  8.3*   --     < > = values in this interval not displayed.       Recent Labs      07/10/17   1904   07/11/17   1600  07/12/17   0030  07/12/17   0400   ALTSGPT  <5   --    --    --    --    ASTSGOT  6*   --    --    --    --    ALKPHOSPHAT  64   --    --    --    --    TBILIRUBIN  0.4   --    --    --    --    GLUCOSE  294*   < >  234*  153*  198*    < > = values in this interval not displayed.       Recent Labs      07/10/17   1904  07/11/17   0315   07/12/17   0030  07/12/17   0400  07/12/17   1156   RBC  4.57*  3.25*   --    --   3.78*   --    HEMOGLOBIN  13.8*  9.9*   < >  11.4*  11.2*  12.7*   HEMATOCRIT  41.2*  31.3*   --    --   33.1*   --    PLATELETCT  488*  272   --    --   297   --     < > = values in this interval not displayed.       Recent Labs      07/10/17   1904  07/11/17   0315  07/12/17   0400   WBC  19.6*  13.0*  13.6*   NEUTSPOLYS  89.40*  82.80*  85.30*   LYMPHOCYTES  3.50*  9.60*  8.60*    MONOCYTES  4.40  5.40  0.90   EOSINOPHILS  0.00  1.20  5.20   BASOPHILS  0.90  0.20  0.00   ASTSGOT  6*   --    --    ALTSGPT  <5   --    --    ALKPHOSPHAT  64   --    --    TBILIRUBIN  0.4   --    --        Imaging  No new imaging  -------------------------------------------------------------------------------------------------  Assessment/Recommendations  # Hematemesis  - Likely PUD/ esophagitis due to NSAIDS  - Stable H/H  - Advance diet as tolerated  - Cont PPI  - Plan to treat DKA  first.    - Please contact  GI for EGD after he is shifted to floor    Call if any issues [phone]

## 2017-07-12 NOTE — PROGRESS NOTES
Dr. Payan returned page, order received to transition off of DKA protocol with administration of lantus 10u subQ now, place on medium sliding scale, stop D10 1/2NS and insulin 2 hrs after administration of lantus, start NS at 100 mL/hr (for Na+ 131) once insulin and D10 1/2NS off for a total of 2L, and initiate a diabetic diet. Orders read back and verified.

## 2017-07-12 NOTE — PROGRESS NOTES
At 1100, Dr. Mendes on the unit assessing pt. MD will order MRI for pt c/o HA for a few months. MD notified RN that pt is ok to transfer, but keep pt on clear liquids for now. RN notified MD that repeat Na level is still waiting to be drawn. Per MD, it is still ok to transfer pt to Medical.

## 2017-07-12 NOTE — PROGRESS NOTES
Bedside report received from REGGIE Ohara. Patient assessed and lines verified. VSS and patient resting comfortably on RA. Bed low and locked with alarm on and call light within reach. POC discussed with patient and communication board updated. All needs currently being met.

## 2017-07-12 NOTE — PROGRESS NOTES
Patient had large bout of emesis (400 mL of undigested food). He had just consumed chicken broth, applesauce and jello. No antinausea meds available and patient being transitioned off of DKA protocol. MD paged for updates. Dr. Payan to return call.

## 2017-07-12 NOTE — PROGRESS NOTES
[facility]    Date of Service: 2017    Chief Complaint  61 y.o. male admitted 7/10/2017 with DKA GIB    Interval Problem Update  No further signs of bleeding, off insulin drip, still with persistent headache    Consultants/Specialty  GI            Review of Systems   Constitutional: Negative for fever and chills.   HENT: Negative for congestion and nosebleeds.    Eyes: Negative for discharge and redness.   Respiratory: Negative for cough, hemoptysis, shortness of breath and wheezing.    Cardiovascular: Negative for chest pain, palpitations, orthopnea and leg swelling.   Gastrointestinal: Negative for nausea, vomiting, abdominal pain and melena.   Genitourinary: Negative for hematuria and flank pain.   Musculoskeletal: Positive for neck pain. Negative for back pain and falls.   Skin: Negative.    Neurological: Positive for headaches. Negative for dizziness, speech change, focal weakness and seizures.   Psychiatric/Behavioral: Negative.       Physical Exam  Laboratory/Imaging   Hemodynamics  Temp (24hrs), Av.8 °C (98.2 °F), Min:36.4 °C (97.5 °F), Max:37.1 °C (98.8 °F)   Temperature: 36.4 °C (97.5 °F)  Pulse  Av.9  Min: 67  Max: 115 Heart Rate (Monitored): 87  NIBP: 132/69 mmHg      Respiratory      Respiration: 15, Pulse Oximetry: 95 %             Fluids    Intake/Output Summary (Last 24 hours) at 17 1609  Last data filed at 17 1400   Gross per 24 hour   Intake 3604.67 ml   Output   4750 ml   Net -1145.33 ml       Nutrition  Orders Placed This Encounter   Procedures   • DIET ORDER     Standing Status: Standing      Number of Occurrences: 1      Standing Expiration Date:      Order Specific Question:  Diet:     Answer:  Clear Liquid [10]      Comments:  pt wears upper and lower dentures but does not have them with him     Order Specific Question:  Diet:     Answer:  Clear Liquids - No Red Foods [12]     Physical Exam   Constitutional: He is oriented to person, place, and  time. He appears well-developed.   HENT:   Head: Normocephalic and atraumatic.   Right Ear: External ear normal.   Left Ear: External ear normal.   Mouth/Throat: No oropharyngeal exudate.   Eyes: Pupils are equal, round, and reactive to light. Right eye exhibits no discharge. Left eye exhibits no discharge. No scleral icterus.   Neck: Neck supple. No JVD present. No tracheal deviation present.   Cardiovascular: Normal rate and regular rhythm.  Exam reveals no gallop and no friction rub.    No murmur heard.  Pulmonary/Chest: Effort normal and breath sounds normal. No respiratory distress. He has no wheezes. He exhibits no tenderness.   Abdominal: Soft. Bowel sounds are normal. He exhibits no distension. There is no tenderness. There is no rebound and no guarding.   Musculoskeletal: He exhibits no edema or tenderness.   Neurological: He is alert and oriented to person, place, and time. No cranial nerve deficit. He exhibits normal muscle tone.   Skin: Skin is warm and dry. He is not diaphoretic. No cyanosis. Nails show no clubbing.   Psychiatric: He has a normal mood and affect. His behavior is normal.   Nursing note and vitals reviewed.      Recent Labs      07/10/17   1904  07/11/17   0315   07/12/17   0030  07/12/17   0400  07/12/17   1156   WBC  19.6*  13.0*   --    --   13.6*   --    RBC  4.57*  3.25*   --    --   3.78*   --    HEMOGLOBIN  13.8*  9.9*   < >  11.4*  11.2*  12.7*   HEMATOCRIT  41.2*  31.3*   --    --   33.1*   --    MCV  90.2  96.9   --    --   87.6   --    MCH  30.2  30.2   --    --   29.6   --    MCHC  33.5*  31.1*   --    --   33.8   --    RDW  44.2  49.3   --    --   41.4   --    PLATELETCT  488*  272   --    --   297   --    MPV  9.3  9.1   --    --   9.3   --     < > = values in this interval not displayed.     Recent Labs      07/11/17   1600  07/12/17   0030  07/12/17   0400  07/12/17   1156   SODIUM  136  131*  126*  130*   POTASSIUM  3.4*  3.8  3.8   --    CHLORIDE  111  105  102   --     CO2  17*  19*  19*   --    GLUCOSE  234*  153*  198*   --    BUN  10  7*  6*   --    CREATININE  0.79  0.83  0.76   --    CALCIUM  8.0*  8.3*  8.3*   --                       Assessment/Plan     GI bleed  Assessment & Plan  Query gastritis /PUD from excessive NSAIDS  protonix  Clinically bleeding resolved  GI following for consideration of EGD     DKA (diabetic ketoacidoses) (CMS-HCC)  Assessment & Plan  resolved    Headache  Assessment & Plan  Given severity and duration 3months per pt will get MRI and check ESR  Avoid nsaids  Start nortriptyline     Anemia associated with acute blood loss  Assessment & Plan  Stable, monitor    Dyslipidemia  Assessment & Plan  simvaststain    Leukocytosis  Assessment & Plan  Likely reactive    Type 2 diabetes mellitus without complication (CMS-HCC)  Assessment & Plan  With hyperglycemia  -lantus 15 units, continue ISS, resume metformin on discharge        Hyponatremia  Assessment & Plan  Improved with hydration      Plan of care reviewed with patient and discussed with nursing staff     Labs reviewed, Medications reviewed and Radiology images reviewed  Abrams catheter: No Abrams      DVT Prophylaxis: Contraindicated - High bleeding risk  DVT prophylaxis - mechanical: SCDs

## 2017-07-12 NOTE — PROGRESS NOTES
MD paged regarding possible transition off of DKA protocol. Patient's FSBS has been 100-250 for several hours with most recent AG at 7 and CO2 at 19. MD also notified that sodium is 131. Awaiting call back from Dr. Payan.

## 2017-07-13 PROBLEM — D72.829 LEUKOCYTOSIS: Status: RESOLVED | Noted: 2017-07-11 | Resolved: 2017-07-13

## 2017-07-13 LAB
ANION GAP SERPL CALC-SCNC: 12 MMOL/L (ref 0–11.9)
BASOPHILS # BLD AUTO: 0.6 % (ref 0–1.8)
BASOPHILS # BLD: 0.06 K/UL (ref 0–0.12)
BUN SERPL-MCNC: 6 MG/DL (ref 8–22)
CALCIUM SERPL-MCNC: 8.8 MG/DL (ref 8.5–10.5)
CHLORIDE SERPL-SCNC: 99 MMOL/L (ref 96–112)
CO2 SERPL-SCNC: 19 MMOL/L (ref 20–33)
CREAT SERPL-MCNC: 0.69 MG/DL (ref 0.5–1.4)
EOSINOPHIL # BLD AUTO: 0.51 K/UL (ref 0–0.51)
EOSINOPHIL NFR BLD: 4.8 % (ref 0–6.9)
ERYTHROCYTE [DISTWIDTH] IN BLOOD BY AUTOMATED COUNT: 40.2 FL (ref 35.9–50)
ERYTHROCYTE [SEDIMENTATION RATE] IN BLOOD BY WESTERGREN METHOD: 107 MM/HOUR (ref 0–20)
GFR SERPL CREATININE-BSD FRML MDRD: >60 ML/MIN/1.73 M 2
GLUCOSE BLD-MCNC: 194 MG/DL (ref 65–99)
GLUCOSE BLD-MCNC: 229 MG/DL (ref 65–99)
GLUCOSE BLD-MCNC: 304 MG/DL (ref 65–99)
GLUCOSE BLD-MCNC: 368 MG/DL (ref 65–99)
GLUCOSE SERPL-MCNC: 193 MG/DL (ref 65–99)
HCT VFR BLD AUTO: 33.8 % (ref 42–52)
HGB BLD-MCNC: 11.7 G/DL (ref 14–18)
IMM GRANULOCYTES # BLD AUTO: 0.09 K/UL (ref 0–0.11)
IMM GRANULOCYTES NFR BLD AUTO: 0.8 % (ref 0–0.9)
LYMPHOCYTES # BLD AUTO: 1.02 K/UL (ref 1–4.8)
LYMPHOCYTES NFR BLD: 9.5 % (ref 22–41)
MCH RBC QN AUTO: 30.2 PG (ref 27–33)
MCHC RBC AUTO-ENTMCNC: 34.6 G/DL (ref 33.7–35.3)
MCV RBC AUTO: 87.1 FL (ref 81.4–97.8)
MONOCYTES # BLD AUTO: 0.69 K/UL (ref 0–0.85)
MONOCYTES NFR BLD AUTO: 6.4 % (ref 0–13.4)
NEUTROPHILS # BLD AUTO: 8.34 K/UL (ref 1.82–7.42)
NEUTROPHILS NFR BLD: 77.9 % (ref 44–72)
NRBC # BLD AUTO: 0 K/UL
NRBC BLD AUTO-RTO: 0 /100 WBC
PLATELET # BLD AUTO: 307 K/UL (ref 164–446)
PMV BLD AUTO: 9 FL (ref 9–12.9)
POTASSIUM SERPL-SCNC: 3.7 MMOL/L (ref 3.6–5.5)
RBC # BLD AUTO: 3.88 M/UL (ref 4.7–6.1)
SODIUM SERPL-SCNC: 130 MMOL/L (ref 135–145)
WBC # BLD AUTO: 10.7 K/UL (ref 4.8–10.8)

## 2017-07-13 PROCEDURE — 93886 INTRACRANIAL COMPLETE STUDY: CPT | Mod: 26 | Performed by: SURGERY

## 2017-07-13 PROCEDURE — 82962 GLUCOSE BLOOD TEST: CPT

## 2017-07-13 PROCEDURE — 700102 HCHG RX REV CODE 250 W/ 637 OVERRIDE(OP): Performed by: HOSPITALIST

## 2017-07-13 PROCEDURE — 80048 BASIC METABOLIC PNL TOTAL CA: CPT

## 2017-07-13 PROCEDURE — 770001 HCHG ROOM/CARE - MED/SURG/GYN PRIV*

## 2017-07-13 PROCEDURE — 3E0234Z INTRODUCTION OF SERUM, TOXOID AND VACCINE INTO MUSCLE, PERCUTANEOUS APPROACH: ICD-10-PCS | Performed by: HOSPITALIST

## 2017-07-13 PROCEDURE — 85652 RBC SED RATE AUTOMATED: CPT

## 2017-07-13 PROCEDURE — 700111 HCHG RX REV CODE 636 W/ 250 OVERRIDE (IP): Performed by: HOSPITALIST

## 2017-07-13 PROCEDURE — 90732 PPSV23 VACC 2 YRS+ SUBQ/IM: CPT | Performed by: HOSPITALIST

## 2017-07-13 PROCEDURE — A9270 NON-COVERED ITEM OR SERVICE: HCPCS | Performed by: HOSPITALIST

## 2017-07-13 PROCEDURE — 85025 COMPLETE CBC W/AUTO DIFF WBC: CPT

## 2017-07-13 PROCEDURE — C9113 INJ PANTOPRAZOLE SODIUM, VIA: HCPCS | Performed by: HOSPITALIST

## 2017-07-13 PROCEDURE — 99233 SBSQ HOSP IP/OBS HIGH 50: CPT | Performed by: HOSPITALIST

## 2017-07-13 PROCEDURE — 93880 EXTRACRANIAL BILAT STUDY: CPT

## 2017-07-13 RX ORDER — PREDNISONE 20 MG/1
40 TABLET ORAL DAILY
Status: DISCONTINUED | OUTPATIENT
Start: 2017-07-13 | End: 2017-07-14

## 2017-07-13 RX ORDER — INSULIN GLARGINE 100 [IU]/ML
20 INJECTION, SOLUTION SUBCUTANEOUS EVERY EVENING
Status: DISCONTINUED | OUTPATIENT
Start: 2017-07-13 | End: 2017-07-14

## 2017-07-13 RX ADMIN — PREDNISONE 40 MG: 20 TABLET ORAL at 12:13

## 2017-07-13 RX ADMIN — FOLIC ACID 1 MG: 1 TABLET ORAL at 08:23

## 2017-07-13 RX ADMIN — Medication 100 MG: at 08:23

## 2017-07-13 RX ADMIN — INSULIN GLARGINE 20 UNITS: 100 INJECTION, SOLUTION SUBCUTANEOUS at 20:21

## 2017-07-13 RX ADMIN — SIMVASTATIN 10 MG: 10 TABLET, FILM COATED ORAL at 20:21

## 2017-07-13 RX ADMIN — OXYCODONE HYDROCHLORIDE 5 MG: 5 TABLET ORAL at 05:15

## 2017-07-13 RX ADMIN — PANTOPRAZOLE SODIUM 40 MG: 40 INJECTION, POWDER, FOR SOLUTION INTRAVENOUS at 20:21

## 2017-07-13 RX ADMIN — OXYCODONE HYDROCHLORIDE 10 MG: 10 TABLET ORAL at 00:30

## 2017-07-13 RX ADMIN — OXYCODONE HYDROCHLORIDE 10 MG: 10 TABLET ORAL at 10:50

## 2017-07-13 RX ADMIN — PNEUMOCOCCAL VACCINE POLYVALENT 25 MCG
25; 25; 25; 25; 25; 25; 25; 25; 25; 25; 25; 25; 25; 25; 25; 25; 25; 25; 25; 25; 25; 25; 25 INJECTION, SOLUTION INTRAMUSCULAR; SUBCUTANEOUS at 08:55

## 2017-07-13 RX ADMIN — LISINOPRIL 10 MG: 10 TABLET ORAL at 08:23

## 2017-07-13 RX ADMIN — STANDARDIZED SENNA CONCENTRATE AND DOCUSATE SODIUM 2 TABLET: 8.6; 5 TABLET, FILM COATED ORAL at 08:23

## 2017-07-13 RX ADMIN — PANTOPRAZOLE SODIUM 40 MG: 40 INJECTION, POWDER, FOR SOLUTION INTRAVENOUS at 08:23

## 2017-07-13 RX ADMIN — STANDARDIZED SENNA CONCENTRATE AND DOCUSATE SODIUM 2 TABLET: 8.6; 5 TABLET, FILM COATED ORAL at 20:21

## 2017-07-13 ASSESSMENT — ENCOUNTER SYMPTOMS
HEADACHES: 1
SORE THROAT: 0
EYE REDNESS: 0
FALLS: 0
WHEEZING: 0
FLANK PAIN: 0
SEIZURES: 0
DIZZINESS: 0
SHORTNESS OF BREATH: 0
SPEECH CHANGE: 0
FEVER: 0
ABDOMINAL PAIN: 0
FOCAL WEAKNESS: 0
PSYCHIATRIC NEGATIVE: 1
EYE DISCHARGE: 0
PALPITATIONS: 0
STRIDOR: 0
COUGH: 0
CHILLS: 0
DIARRHEA: 0
BACK PAIN: 0
NAUSEA: 0
NECK PAIN: 1
VOMITING: 0

## 2017-07-13 ASSESSMENT — PAIN SCALES - GENERAL
PAINLEVEL_OUTOF10: 2
PAINLEVEL_OUTOF10: 2
PAINLEVEL_OUTOF10: 5
PAINLEVEL_OUTOF10: 10
PAINLEVEL_OUTOF10: 2
PAINLEVEL_OUTOF10: 2
PAINLEVEL_OUTOF10: 10
PAINLEVEL_OUTOF10: 10

## 2017-07-13 NOTE — PROGRESS NOTES
Patient made NPO by MD Asim Fofana, for possible biopsy today. Patient educated and all drinks and food taken from bedside. Patient verbalized understanding.

## 2017-07-13 NOTE — PROGRESS NOTES
Okay per MD Asim Fofana for patient to go back on diet, as he spoke with surgeon and unable to perform biopsy today.

## 2017-07-13 NOTE — PROGRESS NOTES
[facility] Hospitalist Progress Note    Date of Service: 2017    Chief Complaint  61 y.o. male admitted 7/10/2017 with DKA GIB    Interval Problem Update  Social complaining of persistent headache no melena or abdominal pain   Consultants/Specialty  GI            Review of Systems   Constitutional: Negative for fever and chills.   HENT: Negative for congestion, nosebleeds and sore throat.    Eyes: Negative for discharge and redness.   Respiratory: Negative for cough, shortness of breath, wheezing and stridor.    Cardiovascular: Negative for chest pain, palpitations and leg swelling.   Gastrointestinal: Negative for nausea, vomiting, abdominal pain, diarrhea and melena.   Genitourinary: Negative for dysuria, hematuria and flank pain.   Musculoskeletal: Positive for neck pain. Negative for back pain and falls.   Skin: Negative.    Neurological: Positive for headaches. Negative for dizziness, speech change, focal weakness and seizures.   Psychiatric/Behavioral: Negative.       Physical Exam  Laboratory/Imaging   Hemodynamics  Temp (24hrs), Av.9 °C (98.4 °F), Min:36.4 °C (97.5 °F), Max:37.2 °C (98.9 °F)   Temperature: 37.1 °C (98.7 °F)  Pulse  Av.7  Min: 67  Max: 115 Heart Rate (Monitored): 87  NIBP: 148/86 mmHg      Respiratory      Respiration: 16, Pulse Oximetry: 95 %             Fluids    Intake/Output Summary (Last 24 hours) at 17 1457  Last data filed at 17 1400   Gross per 24 hour   Intake   2350 ml   Output   3275 ml   Net   -925 ml       Nutrition  Orders Placed This Encounter   Procedures   • DIET ORDER     Standing Status: Standing      Number of Occurrences: 1      Standing Expiration Date:      Order Specific Question:  Diet:     Answer:  Diabetic [3]     Physical Exam   Constitutional: He is oriented to person, place, and time. He appears well-developed.   HENT:   Head: Normocephalic and atraumatic.   Mouth/Throat: Oropharynx is clear and moist.   Mild tenderness over left temporal  artery  Palpable pulse   Eyes: Conjunctivae are normal. Pupils are equal, round, and reactive to light. Right eye exhibits no discharge. Left eye exhibits no discharge. No scleral icterus.   Neck: Neck supple. No JVD present. No tracheal deviation present.   Cardiovascular: Normal rate and regular rhythm.  Exam reveals no gallop and no friction rub.    No murmur heard.  Pulmonary/Chest: Effort normal and breath sounds normal. No stridor. No respiratory distress. He has no wheezes. He exhibits no tenderness.   Abdominal: Soft. Bowel sounds are normal. He exhibits no distension. There is no tenderness. There is no rebound.   Musculoskeletal: He exhibits no edema or tenderness.   Neurological: He is alert and oriented to person, place, and time. No cranial nerve deficit. He exhibits normal muscle tone.   Skin: Skin is warm and dry. He is not diaphoretic. No cyanosis. Nails show no clubbing.   Psychiatric: He has a normal mood and affect. His behavior is normal. Thought content normal.   Nursing note and vitals reviewed.      Recent Labs      07/11/17   0315   07/12/17   0400  07/12/17   1156  07/13/17   0400   WBC  13.0*   --   13.6*   --   10.7   RBC  3.25*   --   3.78*   --   3.88*   HEMOGLOBIN  9.9*   < >  11.2*  12.7*  11.7*   HEMATOCRIT  31.3*   --   33.1*   --   33.8*   MCV  96.9   --   87.6   --   87.1   MCH  30.2   --   29.6   --   30.2   MCHC  31.1*   --   33.8   --   34.6   RDW  49.3   --   41.4   --   40.2   PLATELETCT  272   --   297   --   307   MPV  9.1   --   9.3   --   9.0    < > = values in this interval not displayed.     Recent Labs      07/12/17   0030  07/12/17   0400  07/12/17   1156  07/13/17   0400   SODIUM  131*  126*  130*  130*   POTASSIUM  3.8  3.8   --   3.7   CHLORIDE  105  102   --   99   CO2  19*  19*   --   19*   GLUCOSE  153*  198*   --   193*   BUN  7*  6*   --   6*   CREATININE  0.83  0.76   --   0.69   CALCIUM  8.3*  8.3*   --   8.8                      Assessment/Plan     GI  bleed  Assessment & Plan  Query gastritis /PUD from excessive NSAIDS  protonix  Clinically bleeding resolved  GI following for consideration of EGD tomorrow    DKA (diabetic ketoacidoses) (CMS-HCC)  Assessment & Plan  resolved    Headache  Assessment & Plan  Given age and significantly elevated sed rate concern for possible giant cell arteritis, discuss with Dr. Desai  from vascular surgery he recommended temporal artery duplex and will see the patient in consultation  We will empirically start him on prednisone      Anemia associated with acute blood loss  Assessment & Plan  Hemoglobin 11.7 Stable, monitor    Dyslipidemia  Assessment & Plan  simvaststain    Type 2 diabetes mellitus without complication (CMS-HCC)  Assessment & Plan  With hyperglycemia  -Increase lantus 20units, continue ISS, resume metformin on discharge  -Monitor blood sugars with steroid use      Hyponatremia  Assessment & Plan  Stable monitor      Plan of care reviewed with patient and discussed with nursing staff     Labs reviewed, Medications reviewed and Radiology images reviewed  Abrams catheter: No Abrams      DVT Prophylaxis: Contraindicated - High bleeding risk  DVT prophylaxis - mechanical: SCDs

## 2017-07-13 NOTE — PROGRESS NOTES
Gastroenterology progress note        Author: Dr Silvia Warren ( resident)  Date & Time note created:    7/13/2017   9:50 AM     Visit Time:  9.30AM    Patient ID:   Name:             Marge Frye     YOB: 1955  Age:                 61 y.o.  male   MRN:               [id number]    Attending: Dr. Den Gilmore  -------------------------------------------------------------------------------------------------    ID:  Mr Frye is a 62yo M admitted for DKA, consulted for hemetemesis    Interval History:  Had one episode of vomiting yday, large undigested food (per nurse), per pt, dark brown color but no claire blood.  No abdominal pain. No BM since yday. He is stable regarding DKA and the plan is to discharge him tomorrow     Review of Systems:      Constitutional: Denies fevers/chills  Cardiovascular: Denies chest pain or palpitations   Respiratory: Denies shortness of breath or cough  Gastrointestinal/Hepatic: (+) vomiting. Denies abdominal pain, nausea,  diarrhea, constipation  Genitourinary: Denies dysuria or frequency  Musculoskeletal/Rheum: Denies  joint or muscle pain  Neurological: Denies focal weakness/parasthesias    -------------------------------------------------------------------------------------------------  Physical Exam:  Vitals/ General Appearance:   Weight/BMI: Body mass index is 23.77 kg/(m^2).  Blood pressure 119/78, pulse 72, temperature 37.1 °C (98.7 °F), resp. rate 16, height 1.829 m (6'), weight 79.5 kg (175 lb 4.3 oz), SpO2 95 %.  Filed Vitals:    07/12/17 2000 07/12/17 2100 07/13/17 0630 07/13/17 0800   BP:       Pulse: 78 93  72   Temp: 37.2 °C (98.9 °F)   37.1 °C (98.7 °F)   Resp: 14  16 16   Height:       Weight:       SpO2: 94%   95%     Oxygen Therapy:  Pulse Oximetry: 95 %, O2 (LPM): 0, O2 Delivery: None (Room Air)    Constitutional: No acute distress, Non-toxic  appearance.   [facility]:  NC/AT. MMM. No oral exudates.  Eyes:  PERRL, EOMI, Conjunctiva normal.   Cardiovascular:  Normal heart rate, Normal rhythm, No murmurs, No rubs, No gallops.    Extremities: no cyanosis or edema.  Lungs:  CTABL, no w/r/r. No crackles.   Abdomen: soft, NT, ND. +BS.  Skin: Warm, Dry, No erythema, No rash.  Neurologic: AAOx3, no focal deficits.   ---------------------------------------------------------------------------------------------------  Lab Data Review:  Recent Labs      07/10/17   2245   07/12/17   0030 07/12/17 0400  07/12/17   1156  07/13/17 0400   SODIUM  133*   < >  131*  126*  130*  130*   POTASSIUM  5.1   < >  3.8  3.8   --   3.7   CHLORIDE  106   < >  105  102   --   99   CO2  9*   < >  19*  19*   --   19*   BUN  21   < >  7*  6*   --   6*   CREATININE  1.24   < >  0.83  0.76   --   0.69   MAGNESIUM  1.9   --    --    --    --    --    PHOSPHORUS  2.7   --    --    --    --    --    CALCIUM  9.0   < >  8.3*  8.3*   --   8.8    < > = values in this interval not displayed.       Recent Labs      07/10/17   1904   07/12/17   0030  07/12/17   0400  07/13/17   0400   ALTSGPT  <5   --    --    --    --    ASTSGOT  6*   --    --    --    --    ALKPHOSPHAT  64   --    --    --    --    TBILIRUBIN  0.4   --    --    --    --    GLUCOSE  294*   < >  153*  198*  193*    < > = values in this interval not displayed.       Recent Labs      07/11/17   0315   07/12/17 0400 07/12/17   1156  07/13/17   0400   RBC  3.25*   --   3.78*   --   3.88*   HEMOGLOBIN  9.9*   < >  11.2*  12.7*  11.7*   HEMATOCRIT  31.3*   --   33.1*   --   33.8*   PLATELETCT  272   --   297   --   307    < > = values in this interval not displayed.       Recent Labs      07/10/17   1904  07/11/17   0315  07/12/17   0400  07/13/17   0400   WBC  19.6*  13.0*  13.6*  10.7   NEUTSPOLYS  89.40*  82.80*  85.30*  77.90*   LYMPHOCYTES  3.50*  9.60*  8.60*  9.50*   MONOCYTES  4.40  5.40  0.90  6.40   EOSINOPHILS  0.00  1.20   5.20  4.80   BASOPHILS  0.90  0.20  0.00  0.60   ASTSGOT  6*   --    --    --    ALTSGPT  <5   --    --    --    ALKPHOSPHAT  64   --    --    --    TBILIRUBIN  0.4   --    --    --        Imaging  No new imaging  -------------------------------------------------------------------------------------------------  Assessment/Recommendations  # Hematemesis  - Likely PUD or gastritis due to NSAIDS  - Stable H/H  - Cont PPI  - NPO from midnight for EGD tomorrow    Call if any issues [phone]

## 2017-07-13 NOTE — PROGRESS NOTES
Bedside report received from REGGIE Jacobo. Patient assessed and lines verified. Patient resting comfortably on RA. Bed low and locked with alarm on and call light within reach. POC discussed with patient and communication board updated.

## 2017-07-14 LAB
ANION GAP SERPL CALC-SCNC: 12 MMOL/L (ref 0–11.9)
BASOPHILS # BLD AUTO: 0.5 % (ref 0–1.8)
BASOPHILS # BLD: 0.05 K/UL (ref 0–0.12)
BUN SERPL-MCNC: 17 MG/DL (ref 8–22)
CALCIUM SERPL-MCNC: 9.5 MG/DL (ref 8.5–10.5)
CHLORIDE SERPL-SCNC: 99 MMOL/L (ref 96–112)
CO2 SERPL-SCNC: 23 MMOL/L (ref 20–33)
CREAT SERPL-MCNC: 0.94 MG/DL (ref 0.5–1.4)
EOSINOPHIL # BLD AUTO: 0.04 K/UL (ref 0–0.51)
EOSINOPHIL NFR BLD: 0.4 % (ref 0–6.9)
ERYTHROCYTE [DISTWIDTH] IN BLOOD BY AUTOMATED COUNT: 40.3 FL (ref 35.9–50)
GFR SERPL CREATININE-BSD FRML MDRD: >60 ML/MIN/1.73 M 2
GLUCOSE BLD-MCNC: 180 MG/DL (ref 65–99)
GLUCOSE BLD-MCNC: 186 MG/DL (ref 65–99)
GLUCOSE BLD-MCNC: 190 MG/DL (ref 65–99)
GLUCOSE BLD-MCNC: 218 MG/DL (ref 65–99)
GLUCOSE BLD-MCNC: 253 MG/DL (ref 65–99)
GLUCOSE SERPL-MCNC: 243 MG/DL (ref 65–99)
HCT VFR BLD AUTO: 38.7 % (ref 42–52)
HGB BLD-MCNC: 13.4 G/DL (ref 14–18)
IMM GRANULOCYTES # BLD AUTO: 0.12 K/UL (ref 0–0.11)
IMM GRANULOCYTES NFR BLD AUTO: 1.2 % (ref 0–0.9)
LYMPHOCYTES # BLD AUTO: 0.93 K/UL (ref 1–4.8)
LYMPHOCYTES NFR BLD: 9.6 % (ref 22–41)
MCH RBC QN AUTO: 30.2 PG (ref 27–33)
MCHC RBC AUTO-ENTMCNC: 34.6 G/DL (ref 33.7–35.3)
MCV RBC AUTO: 87.2 FL (ref 81.4–97.8)
MONOCYTES # BLD AUTO: 0.55 K/UL (ref 0–0.85)
MONOCYTES NFR BLD AUTO: 5.7 % (ref 0–13.4)
NEUTROPHILS # BLD AUTO: 7.98 K/UL (ref 1.82–7.42)
NEUTROPHILS NFR BLD: 82.6 % (ref 44–72)
NRBC # BLD AUTO: 0 K/UL
NRBC BLD AUTO-RTO: 0 /100 WBC
PLATELET # BLD AUTO: 385 K/UL (ref 164–446)
PMV BLD AUTO: 9 FL (ref 9–12.9)
POTASSIUM SERPL-SCNC: 4.5 MMOL/L (ref 3.6–5.5)
RBC # BLD AUTO: 4.44 M/UL (ref 4.7–6.1)
SODIUM SERPL-SCNC: 134 MMOL/L (ref 135–145)
WBC # BLD AUTO: 9.7 K/UL (ref 4.8–10.8)

## 2017-07-14 PROCEDURE — A9270 NON-COVERED ITEM OR SERVICE: HCPCS | Performed by: INTERNAL MEDICINE

## 2017-07-14 PROCEDURE — 82962 GLUCOSE BLOOD TEST: CPT

## 2017-07-14 PROCEDURE — A9270 NON-COVERED ITEM OR SERVICE: HCPCS | Performed by: HOSPITALIST

## 2017-07-14 PROCEDURE — 770001 HCHG ROOM/CARE - MED/SURG/GYN PRIV*

## 2017-07-14 PROCEDURE — 160203 HCHG ENDO MINUTES - 1ST 30 MINS LEVEL 4: Performed by: INTERNAL MEDICINE

## 2017-07-14 PROCEDURE — 160035 HCHG PACU - 1ST 60 MINS PHASE I: Performed by: INTERNAL MEDICINE

## 2017-07-14 PROCEDURE — 85025 COMPLETE CBC W/AUTO DIFF WBC: CPT

## 2017-07-14 PROCEDURE — 160002 HCHG RECOVERY MINUTES (STAT): Performed by: INTERNAL MEDICINE

## 2017-07-14 PROCEDURE — 700102 HCHG RX REV CODE 250 W/ 637 OVERRIDE(OP): Performed by: HOSPITALIST

## 2017-07-14 PROCEDURE — 700111 HCHG RX REV CODE 636 W/ 250 OVERRIDE (IP)

## 2017-07-14 PROCEDURE — 160048 HCHG OR STATISTICAL LEVEL 1-5: Performed by: INTERNAL MEDICINE

## 2017-07-14 PROCEDURE — 500066 HCHG BITE BLOCK, ECT: Performed by: INTERNAL MEDICINE

## 2017-07-14 PROCEDURE — 0DJ08ZZ INSPECTION OF UPPER INTESTINAL TRACT, VIA NATURAL OR ARTIFICIAL OPENING ENDOSCOPIC: ICD-10-PCS | Performed by: INTERNAL MEDICINE

## 2017-07-14 PROCEDURE — 99232 SBSQ HOSP IP/OBS MODERATE 35: CPT | Performed by: HOSPITALIST

## 2017-07-14 PROCEDURE — 99152 MOD SED SAME PHYS/QHP 5/>YRS: CPT | Performed by: INTERNAL MEDICINE

## 2017-07-14 PROCEDURE — 700102 HCHG RX REV CODE 250 W/ 637 OVERRIDE(OP): Performed by: INTERNAL MEDICINE

## 2017-07-14 PROCEDURE — 80048 BASIC METABOLIC PNL TOTAL CA: CPT

## 2017-07-14 PROCEDURE — C9113 INJ PANTOPRAZOLE SODIUM, VIA: HCPCS | Performed by: HOSPITALIST

## 2017-07-14 PROCEDURE — 700111 HCHG RX REV CODE 636 W/ 250 OVERRIDE (IP): Performed by: HOSPITALIST

## 2017-07-14 RX ORDER — SODIUM CHLORIDE 9 MG/ML
500 INJECTION, SOLUTION INTRAVENOUS
Status: ACTIVE | OUTPATIENT
Start: 2017-07-14 | End: 2017-07-14

## 2017-07-14 RX ORDER — MIDAZOLAM HYDROCHLORIDE 1 MG/ML
.5-2 INJECTION INTRAMUSCULAR; INTRAVENOUS PRN
Status: ACTIVE | OUTPATIENT
Start: 2017-07-14 | End: 2017-07-14

## 2017-07-14 RX ORDER — NORTRIPTYLINE HYDROCHLORIDE 10 MG/1
10 CAPSULE ORAL EVERY EVENING
Status: DISCONTINUED | OUTPATIENT
Start: 2017-07-14 | End: 2017-07-18 | Stop reason: HOSPADM

## 2017-07-14 RX ORDER — MIDAZOLAM HYDROCHLORIDE 1 MG/ML
INJECTION INTRAMUSCULAR; INTRAVENOUS
Status: DISCONTINUED | OUTPATIENT
Start: 2017-07-14 | End: 2017-07-14 | Stop reason: HOSPADM

## 2017-07-14 RX ORDER — AMOXICILLIN AND CLAVULANATE POTASSIUM 875; 125 MG/1; MG/1
1 TABLET, FILM COATED ORAL EVERY 12 HOURS
Status: DISCONTINUED | OUTPATIENT
Start: 2017-07-14 | End: 2017-07-18 | Stop reason: HOSPADM

## 2017-07-14 RX ORDER — OMEPRAZOLE 20 MG/1
40 CAPSULE, DELAYED RELEASE ORAL 2 TIMES DAILY
Status: DISCONTINUED | OUTPATIENT
Start: 2017-07-14 | End: 2017-07-18 | Stop reason: HOSPADM

## 2017-07-14 RX ADMIN — OXYCODONE HYDROCHLORIDE 5 MG: 5 TABLET ORAL at 11:48

## 2017-07-14 RX ADMIN — OMEPRAZOLE 40 MG: 20 CAPSULE, DELAYED RELEASE ORAL at 19:57

## 2017-07-14 RX ADMIN — OXYCODONE HYDROCHLORIDE 5 MG: 5 TABLET ORAL at 05:06

## 2017-07-14 RX ADMIN — INSULIN HUMAN 20 UNITS: 100 INJECTION, SUSPENSION SUBCUTANEOUS at 17:59

## 2017-07-14 RX ADMIN — OXYCODONE HYDROCHLORIDE 10 MG: 10 TABLET ORAL at 22:23

## 2017-07-14 RX ADMIN — INSULIN HUMAN 20 UNITS: 100 INJECTION, SUSPENSION SUBCUTANEOUS at 10:19

## 2017-07-14 RX ADMIN — NORTRIPTYLINE HYDROCHLORIDE 10 MG: 10 CAPSULE ORAL at 21:26

## 2017-07-14 RX ADMIN — OXYCODONE HYDROCHLORIDE 10 MG: 10 TABLET ORAL at 17:55

## 2017-07-14 RX ADMIN — AMOXICILLIN AND CLAVULANATE POTASSIUM 1 TABLET: 875; 125 TABLET, FILM COATED ORAL at 13:49

## 2017-07-14 RX ADMIN — AMOXICILLIN AND CLAVULANATE POTASSIUM 1 TABLET: 875; 125 TABLET, FILM COATED ORAL at 19:56

## 2017-07-14 RX ADMIN — PANTOPRAZOLE SODIUM 40 MG: 40 INJECTION, POWDER, FOR SOLUTION INTRAVENOUS at 10:16

## 2017-07-14 RX ADMIN — SIMVASTATIN 10 MG: 10 TABLET, FILM COATED ORAL at 21:26

## 2017-07-14 RX ADMIN — STANDARDIZED SENNA CONCENTRATE AND DOCUSATE SODIUM 2 TABLET: 8.6; 5 TABLET, FILM COATED ORAL at 19:57

## 2017-07-14 ASSESSMENT — ENCOUNTER SYMPTOMS
PALPITATIONS: 0
SHORTNESS OF BREATH: 0
EYE DISCHARGE: 0
PSYCHIATRIC NEGATIVE: 1
SPEECH CHANGE: 0
BACK PAIN: 0
FOCAL WEAKNESS: 0
NAUSEA: 0
NECK PAIN: 1
SORE THROAT: 0
HEMOPTYSIS: 0
HEADACHES: 1
SEIZURES: 0
WHEEZING: 0
CHILLS: 0
FALLS: 0
FEVER: 0
FLANK PAIN: 0
ABDOMINAL PAIN: 0
VOMITING: 0
STRIDOR: 0
EYE REDNESS: 0
COUGH: 0
LOSS OF CONSCIOUSNESS: 0

## 2017-07-14 ASSESSMENT — PAIN SCALES - GENERAL
PAINLEVEL_OUTOF10: 3
PAINLEVEL_OUTOF10: 10
PAINLEVEL_OUTOF10: 7
PAINLEVEL_OUTOF10: 8
PAINLEVEL_OUTOF10: 9
PAINLEVEL_OUTOF10: 2
PAINLEVEL_OUTOF10: 7
PAINLEVEL_OUTOF10: 9
PAINLEVEL_OUTOF10: 3
PAINLEVEL_OUTOF10: 2

## 2017-07-14 NOTE — PROGRESS NOTES
Surgical Progress Note    Author: Hakeem Adame Date & Time created: 2017   5:42 AM     Interval Events:  Generic HA s/p rx for DKA. Pt also has complaints of left ear hearing compromise.  Outside CT of head available but no reports.  STA u/s negative for halo.  ROS  Hemodynamics:  Temp (24hrs), Av.1 °C (98.7 °F), Min:37.1 °C (98.7 °F), Max:37.1 °C (98.7 °F)  Temperature: 37.1 °C (98.7 °F)  Pulse  Av.7  Min: 67  Max: 115   NIBP: 129/74 mmHg     Respiratory:    Respiration: 16, Pulse Oximetry: 95 %           Neuro:  GCS Total [place] Coma Score: 15     Fluids:    Intake/Output Summary (Last 24 hours) at 17 0542  Last data filed at 17 2100   Gross per 24 hour   Intake   1180 ml   Output   3075 ml   Net  -1895 ml        Current Diet Order   Procedures   • DIET NPO     Physical Exam  Labs:  Recent Results (from the past 24 hour(s))   ACCU-CHEK GLUCOSE    Collection Time: 17  6:24 AM   Result Value Ref Range    Glucose - Accu-Ck 194 (H) 65 - 99 mg/dL   ACCU-CHEK GLUCOSE    Collection Time: 17 12:16 PM   Result Value Ref Range    Glucose - Accu-Ck 229 (H) 65 - 99 mg/dL   ACCU-CHEK GLUCOSE    Collection Time: 17  5:06 PM   Result Value Ref Range    Glucose - Accu-Ck 304 (H) 65 - 99 mg/dL   ACCU-CHEK GLUCOSE    Collection Time: 17  8:20 PM   Result Value Ref Range    Glucose - Accu-Ck 368 (H) 65 - 99 mg/dL     Medical Decision Making, by Problem:  Active Hospital Problems    Diagnosis   • GI bleed [K92.2]     Priority: High   • Type 2 diabetes mellitus without complication (CMS-HCC) [E11.9]   • Hyponatremia [E87.1]   • Headache [R51]   • Anemia associated with acute blood loss [D62]   • Dyslipidemia [E78.5]   • DKA (diabetic ketoacidoses) (CMS-HCC) [E13.10]     Plan:  Probably does not have temporal arteritis, but could rx with depo medrol dose pack empirically; this will interfere unfortunately with BS.  Could bx vessel, but probably low yield  Disposition as per  hospitalists    Quality Measures:  Core Measures

## 2017-07-14 NOTE — CONSULTS
"7-    Chief complaint: Headache    History of present illness: This 62-year-old male prisoner with multiple medical problems was admitted several days ago with diabetic ketoacidosis    He is complained of headache for several days which is ill-defined and he states that it is bilateral and descending\". Indeed, he states that he is having problems. His left ear today    He had an outside CT scan but I do not have a report    His blood sugars under better control and his sedimentation rate is significantly elevated    He had an ultrasound of his superficial temporal arteries and no abnormalities were seen    Is seen now for temporal arteritis    Past medical history: Operations: None listed    Medical diseases: Hepatitis C, hypertension, diabetes    Medications: Glucophage, Zocor, Prinivil    Allergies: None listed    Family history: Negative    Social history: Patient is a smoker and preserved    Review of systems: Patient states that overall he feels better    Physical exam: Temp 36 9, blood pressure 148/86, pulse 87    HEENT: Without icterus, no temporal tenderness    Neck: No masses, no tenderness, no thyromegaly    Chest: Coarse breath sounds    Cardiac: Regular rate without rubs, gallops, or murmurs    Abdomen: Scaphoid, no hepatosplenomegaly, no tenderness, no masses    Rectal/genital: Deferred.    Extremities: 2+ pulses, trace edema    Impression: Headache-question etiology; negative ultrasound of superficial temporal arteries    Diabetic ketoacidosis-resolving    Hypertension    Hepatitis C    Hyperlipidemia    COPD    Plan: Is empirically been started on steroids which will certainly have an effect on his blood sugar    Estimated ultrasound, we decided to do a temporal artery biopsy, although I think the yield is low    I be happy to entertain discussion with the hospitalist involved with this gentleman    Thank very much this consultation  "

## 2017-07-14 NOTE — DISCHARGE PLANNING
Discussed pt during AM Rounds. Pt is a prisoner of the [place] [facility] and has 2 guards bedside. RN reports pt is A&Ox4. RN reports pt is complaining of headaches. The pt has not been assessed by any therapies. The pt is curently on 2 lpm of O2. The pt's dc will most likely be back to the correctional facility.

## 2017-07-14 NOTE — PROCEDURES
Procedure: EGD  Pre procedure diagnosis: Hematemesis  Post procedure diagnosis: LA grade classification C erosive esophagitis without active GIB otherwise normal EGD.   MD: Tunde   Medications: Versed 3 and Fentanyl 75  Risks and benefits: Risks including but not limited to bleeding, perforation, side effects of medication were all reviewed.   Procedure: Patient was placed left lateral, bite block placed, sedation safely achieved, scope passed. Esophagus revealed 360 degree circumferential involvement of erosive esophagitis involving the distal esophagus without any high risk bleeding lesions and no evidence of new or old blood. The stomach (forward and retroflexed views) and duodenal (forward views) WNL. Scope removed.     Complications, Blood loss, Specimens: None    Recommendations: Continue PPI therapy, omeprazole 40 mg PO BID or equivalent for 1 month with antireflux measures.   Please call with any further questions.

## 2017-07-14 NOTE — PROGRESS NOTES
[facility] Hospitalist Progress Note    Date of Service: 2017    Chief Complaint  61 y.o. male admitted 7/10/2017 with DKA GIB    Interval Problem Update  Social complaining of persistent headache duplex normal complains of decreased hearing in left ear   no melena or abdominal pain   EGD done esophagitis  Consultants/Specialty  GI            Review of Systems   Constitutional: Negative for fever and chills.   HENT: Positive for hearing loss. Negative for congestion, nosebleeds and sore throat.    Eyes: Negative for discharge and redness.   Respiratory: Negative for cough, hemoptysis, shortness of breath, wheezing and stridor.    Cardiovascular: Negative for chest pain, palpitations and leg swelling.   Gastrointestinal: Negative for nausea, vomiting, abdominal pain and melena.   Genitourinary: Negative for dysuria, hematuria and flank pain.   Musculoskeletal: Positive for neck pain. Negative for back pain and falls.   Skin: Negative.    Neurological: Positive for headaches. Negative for speech change, focal weakness, seizures and loss of consciousness.   Psychiatric/Behavioral: Negative.       Physical Exam  Laboratory/Imaging   Hemodynamics  Temp (24hrs), Av.1 °C (98.7 °F), Min:37.1 °C (98.7 °F), Max:37.1 °C (98.7 °F)   Temperature: 37.1 °C (98.7 °F)  Pulse  Av.2  Min: 65  Max: 115 Heart Rate (Monitored): 79  NIBP: 147/76 mmHg      Respiratory      Respiration: (!) 27, Pulse Oximetry: 93 %             Fluids    Intake/Output Summary (Last 24 hours) at 17 1657  Last data filed at 17 0400   Gross per 24 hour   Intake    960 ml   Output   2150 ml   Net  -1190 ml       Nutrition  Orders Placed This Encounter   Procedures   • DIET ORDER     Standing Status: Standing      Number of Occurrences: 1      Standing Expiration Date:      Order Specific Question:  Diet:     Answer:  Diabetic [3]     Physical Exam   Constitutional: He is oriented to person, place, and time. He appears well-developed.    HENT:   Head: Normocephalic and atraumatic.   Right Ear: External ear normal.   Left Ear: External ear normal. No drainage, swelling or tenderness. Tympanic membrane is not bulging.   Eyes: Right eye exhibits no discharge. Left eye exhibits no discharge. No scleral icterus.   Neck: Neck supple. No JVD present. No tracheal deviation present.   Cardiovascular: Normal rate and regular rhythm.  Exam reveals no gallop and no friction rub.    No murmur heard.  Pulmonary/Chest: Effort normal and breath sounds normal. No stridor. No respiratory distress. He has no wheezes. He exhibits no tenderness.   Abdominal: Soft. Bowel sounds are normal. He exhibits no distension. There is no tenderness. There is no rebound.   Musculoskeletal: He exhibits no edema or tenderness.   Neurological: He is alert and oriented to person, place, and time. No cranial nerve deficit. He exhibits normal muscle tone.   Skin: Skin is warm and dry. He is not diaphoretic. No cyanosis. Nails show no clubbing.   Psychiatric: He has a normal mood and affect. His behavior is normal. Thought content normal.   Nursing note and vitals reviewed.      Recent Labs      07/12/17 0400 07/12/17 1156 07/13/17 0400 07/14/17   0525   WBC  13.6*   --   10.7  9.7   RBC  3.78*   --   3.88*  4.44*   HEMOGLOBIN  11.2*  12.7*  11.7*  13.4*   HEMATOCRIT  33.1*   --   33.8*  38.7*   MCV  87.6   --   87.1  87.2   MCH  29.6   --   30.2  30.2   MCHC  33.8   --   34.6  34.6   RDW  41.4   --   40.2  40.3   PLATELETCT  297   --   307  385   MPV  9.3   --   9.0  9.0     Recent Labs      07/12/17   0400  07/12/17   1156 07/13/17 0400 07/14/17   0525   SODIUM  126*  130*  130*  134*   POTASSIUM  3.8   --   3.7  4.5   CHLORIDE  102   --   99  99   CO2  19*   --   19*  23   GLUCOSE  198*   --   193*  243*   BUN  6*   --   6*  17   CREATININE  0.76   --   0.69  0.94   CALCIUM  8.3*   --   8.8  9.5                      Assessment/Plan     GI bleed  Assessment & Plan  EGD  esophagitis  Continue prilosec    DKA (diabetic ketoacidoses) (CMS-HCC)  Assessment & Plan  resolved    Headache  Assessment & Plan  Discussed with Dr. Desai  temporal artery duplex normal, he does not feel that biopsy is warranted  Will d/c prednisone  Given mastoid fluids and decrease hearing will empirically treat with augmentin    Anemia associated with acute blood loss  Assessment & Plan  Hemoglobin 11.7 Stable, monitor    Dyslipidemia  Assessment & Plan  simvaststain    Type 2 diabetes mellitus without complication (CMS-HCC)  Assessment & Plan  With hyperglycemia  Increased cbg's due to to prednisone (stopped per above)  NPH 20 bid, ISS, monitor cbg's and adjust      Hyponatremia  Assessment & Plan  Improved  monitor      Plan of care reviewed with patient and discussed with nursing staff     Labs reviewed, Medications reviewed and Radiology images reviewed  Abrams catheter: No Abrams      DVT Prophylaxis: Contraindicated - High bleeding risk  DVT prophylaxis - mechanical: SCDs

## 2017-07-14 NOTE — PROGRESS NOTES
Report received from night shift RN.  Patient in bed, alert and oriented x4.  Patient is NPO for endoscopy today.

## 2017-07-15 LAB
GLUCOSE BLD-MCNC: 193 MG/DL (ref 65–99)
GLUCOSE BLD-MCNC: 193 MG/DL (ref 65–99)
GLUCOSE BLD-MCNC: 265 MG/DL (ref 65–99)
GLUCOSE BLD-MCNC: 298 MG/DL (ref 65–99)
GLUCOSE BLD-MCNC: 318 MG/DL (ref 65–99)

## 2017-07-15 PROCEDURE — A9270 NON-COVERED ITEM OR SERVICE: HCPCS | Performed by: HOSPITALIST

## 2017-07-15 PROCEDURE — A9270 NON-COVERED ITEM OR SERVICE: HCPCS | Performed by: INTERNAL MEDICINE

## 2017-07-15 PROCEDURE — 770006 HCHG ROOM/CARE - MED/SURG/GYN SEMI*

## 2017-07-15 PROCEDURE — 700102 HCHG RX REV CODE 250 W/ 637 OVERRIDE(OP): Performed by: INTERNAL MEDICINE

## 2017-07-15 PROCEDURE — 82962 GLUCOSE BLOOD TEST: CPT | Mod: 91

## 2017-07-15 PROCEDURE — 99232 SBSQ HOSP IP/OBS MODERATE 35: CPT | Performed by: INTERNAL MEDICINE

## 2017-07-15 PROCEDURE — 700102 HCHG RX REV CODE 250 W/ 637 OVERRIDE(OP): Performed by: HOSPITALIST

## 2017-07-15 RX ORDER — MECLIZINE HCL 12.5 MG/1
12.5 TABLET ORAL 3 TIMES DAILY PRN
Status: DISCONTINUED | OUTPATIENT
Start: 2017-07-15 | End: 2017-07-18 | Stop reason: HOSPADM

## 2017-07-15 RX ADMIN — INSULIN HUMAN 20 UNITS: 100 INJECTION, SUSPENSION SUBCUTANEOUS at 06:04

## 2017-07-15 RX ADMIN — INSULIN HUMAN 20 UNITS: 100 INJECTION, SUSPENSION SUBCUTANEOUS at 17:40

## 2017-07-15 RX ADMIN — LISINOPRIL 10 MG: 10 TABLET ORAL at 08:28

## 2017-07-15 RX ADMIN — STANDARDIZED SENNA CONCENTRATE AND DOCUSATE SODIUM 2 TABLET: 8.6; 5 TABLET, FILM COATED ORAL at 08:28

## 2017-07-15 RX ADMIN — AMOXICILLIN AND CLAVULANATE POTASSIUM 1 TABLET: 875; 125 TABLET, FILM COATED ORAL at 22:08

## 2017-07-15 RX ADMIN — ACETAMINOPHEN 650 MG: 325 TABLET, FILM COATED ORAL at 11:43

## 2017-07-15 RX ADMIN — NORTRIPTYLINE HYDROCHLORIDE 10 MG: 10 CAPSULE ORAL at 22:08

## 2017-07-15 RX ADMIN — SIMVASTATIN 10 MG: 10 TABLET, FILM COATED ORAL at 22:07

## 2017-07-15 RX ADMIN — MECLIZINE HYDROCHLORIDE 12.5 MG: 12.5 TABLET ORAL at 17:39

## 2017-07-15 RX ADMIN — ACETAMINOPHEN 650 MG: 325 TABLET, FILM COATED ORAL at 06:01

## 2017-07-15 RX ADMIN — AMOXICILLIN AND CLAVULANATE POTASSIUM 1 TABLET: 875; 125 TABLET, FILM COATED ORAL at 08:28

## 2017-07-15 RX ADMIN — STANDARDIZED SENNA CONCENTRATE AND DOCUSATE SODIUM 2 TABLET: 8.6; 5 TABLET, FILM COATED ORAL at 22:08

## 2017-07-15 RX ADMIN — OXYCODONE HYDROCHLORIDE 5 MG: 5 TABLET ORAL at 22:15

## 2017-07-15 RX ADMIN — OMEPRAZOLE 40 MG: 20 CAPSULE, DELAYED RELEASE ORAL at 08:28

## 2017-07-15 RX ADMIN — ACETAMINOPHEN 650 MG: 325 TABLET, FILM COATED ORAL at 19:39

## 2017-07-15 RX ADMIN — OMEPRAZOLE 40 MG: 20 CAPSULE, DELAYED RELEASE ORAL at 22:08

## 2017-07-15 ASSESSMENT — ENCOUNTER SYMPTOMS
NAUSEA: 0
LOSS OF CONSCIOUSNESS: 0
CHILLS: 0
PALPITATIONS: 0
PHOTOPHOBIA: 0
FOCAL WEAKNESS: 0
FLANK PAIN: 0
NECK PAIN: 1
FALLS: 0
WHEEZING: 0
SPEECH CHANGE: 0
ABDOMINAL PAIN: 0
FEVER: 0
PSYCHIATRIC NEGATIVE: 1
VOMITING: 0
STRIDOR: 0
DIAPHORESIS: 0
BACK PAIN: 0
COUGH: 0
SHORTNESS OF BREATH: 0
SEIZURES: 0
HEMOPTYSIS: 0
EYE DISCHARGE: 0
HEADACHES: 1
SORE THROAT: 0

## 2017-07-15 ASSESSMENT — PAIN SCALES - GENERAL
PAINLEVEL_OUTOF10: 8
PAINLEVEL_OUTOF10: 2
PAINLEVEL_OUTOF10: 5

## 2017-07-15 NOTE — PROGRESS NOTES
Received report and patient arrived to floor at approximately 2000 via wheelchair. Patient alert, oriented, and appears without distress at this time. Plan of care reviewed and patient ambulated per self to bed with standby assist. Tolerated well. Patient c/o headache at this time, and states that he has had a headache all day. Plan of care reviewed, questions/concerns addressed. Fall precautions in place, bed alarm on, call light within reach, two guards at bedside.

## 2017-07-15 NOTE — DISCHARGE PLANNING
Medical Social Work    MORENO spoke with one of the officers in pt's room to obtain a phone number for the [facility] in [place], [place].   MORENO made PC to [facility] and left a message with the Supervisor RN and Medical Admin re: the ability to accommodate pt's injectable insulin.     Awaiting call back.

## 2017-07-15 NOTE — CONSULTS
Diabetes Education:  Patient with existing type 2 diabetes, HbA1c= 11.7%.  Patient is currently incarcerated for the next 11 months.  He will not be able to administer his own insulin while in snf.    Reviewed insulin types, technique for insulin administration, storage, disposal of needles. Patient is aware he will not be able to skip lunch if he is required to take insulin.    Reviewed basic type 2 care including nutrition, exercise, FSBG testing, medications, sick day care, foot care, preventing and treating hypoglycemia, preventing complications.  Written material provided.

## 2017-07-15 NOTE — PROGRESS NOTES
[facility] Hospitalist Progress Note    Date of Service: 7/15/2017    Chief Complaint  61 y.o. male admitted 7/10/2017 with DKA GIB    Interval Problem Update  7/15 patient still complains of headache, however no significant fever or leukocytosis. Clinically not impressive. Patient described Patient's pain is local, 6-8/10, intermittent and does not radiate to other location, sharp and with some tingling. Can be controlled by pain meds.    Consultants/Specialty  GI            Review of Systems   Constitutional: Negative for fever, chills and diaphoresis.   HENT: Positive for hearing loss. Negative for congestion, nosebleeds and sore throat.    Eyes: Negative for photophobia and discharge.   Respiratory: Negative for cough, hemoptysis, shortness of breath, wheezing and stridor.    Cardiovascular: Negative for chest pain, palpitations and leg swelling.   Gastrointestinal: Negative for nausea, vomiting, abdominal pain and melena.   Genitourinary: Negative for dysuria, urgency and flank pain.   Musculoskeletal: Positive for neck pain. Negative for back pain and falls.   Skin: Negative.    Neurological: Positive for headaches. Negative for speech change, focal weakness, seizures and loss of consciousness.   Psychiatric/Behavioral: Negative.       Physical Exam  Laboratory/Imaging   Hemodynamics  Temp (24hrs), Av.8 °C (98.2 °F), Min:36.2 °C (97.1 °F), Max:37.2 °C (98.9 °F)   Temperature: 36.2 °C (97.1 °F)  Pulse  Av.9  Min: 65  Max: 115 Heart Rate (Monitored): 76  Blood Pressure: 157/86 mmHg, NIBP: 155/89 mmHg      Respiratory      Respiration: 19, Pulse Oximetry: 95 %             Fluids    Intake/Output Summary (Last 24 hours) at 07/15/17 0712  Last data filed at 17 2200   Gross per 24 hour   Intake    740 ml   Output   1095 ml   Net   -355 ml       Nutrition  Orders Placed This Encounter   Procedures   • DIET ORDER     Standing Status: Standing      Number of Occurrences: 1      Standing Expiration Date:       Order Specific Question:  Diet:     Answer:  Diabetic [3]     Physical Exam   Constitutional: He is oriented to person, place, and time. He appears well-developed and well-nourished.   HENT:   Head: Normocephalic.   Right Ear: External ear normal.   Left Ear: External ear normal. No drainage, swelling or tenderness. Tympanic membrane is not bulging.   Eyes: Right eye exhibits no discharge. Left eye exhibits no discharge. No scleral icterus.   Neck: Neck supple. No JVD present. No tracheal deviation present.   Cardiovascular: Normal rate and regular rhythm.  Exam reveals no gallop and no friction rub.    No murmur heard.  Pulmonary/Chest: Effort normal and breath sounds normal. No stridor. No respiratory distress. He has no rales. He exhibits no tenderness.   Abdominal: Soft. He exhibits no distension. There is no tenderness. There is no guarding.   Musculoskeletal: He exhibits no edema or tenderness.   Neurological: He is alert and oriented to person, place, and time. No cranial nerve deficit. He exhibits normal muscle tone.   Skin: Skin is warm and dry. He is not diaphoretic. No cyanosis. Nails show no clubbing.   Psychiatric: He has a normal mood and affect. His behavior is normal. Thought content normal.   Nursing note and vitals reviewed.      Recent Labs      07/12/17   1156  07/13/17   0400  07/14/17   0525   WBC   --   10.7  9.7   RBC   --   3.88*  4.44*   HEMOGLOBIN  12.7*  11.7*  13.4*   HEMATOCRIT   --   33.8*  38.7*   MCV   --   87.1  87.2   MCH   --   30.2  30.2   MCHC   --   34.6  34.6   RDW   --   40.2  40.3   PLATELETCT   --   307  385   MPV   --   9.0  9.0     Recent Labs      07/12/17   1156  07/13/17   0400  07/14/17   0525   SODIUM  130*  130*  134*   POTASSIUM   --   3.7  4.5   CHLORIDE   --   99  99   CO2   --   19*  23   GLUCOSE   --   193*  243*   BUN   --   6*  17   CREATININE   --   0.69  0.94   CALCIUM   --   8.8  9.5                      Assessment/Plan     GI bleed  Assessment & Plan  EGD  esophagitis  Continue prilosec  Hemoglobin stable    DKA (diabetic ketoacidoses) (CMS-HCC)  Assessment & Plan  resolved    Headache  Assessment & Plan  Discussed with Dr. Desai  temporal artery duplex normal, he does not feel that biopsy is warranted  Will d/c prednisone  Given mastoid fluids and decrease hearing will empirically treat with augmentin    Anemia associated with acute blood loss  Assessment & Plan  Hemoglobin 11.7->13.4 Stable, monitor    Dyslipidemia  Assessment & Plan  simvaststain    Type 2 diabetes mellitus without complication (CMS-HCC)  Assessment & Plan  With hyperglycemia  Increased cbg's due to to prednisone (stopped per above)  NPH 20 bid, ISS, monitor cbg's and adjust  Pending confirm from halfway to be able to give NPH injection      Hyponatremia  Assessment & Plan  Improved  monitor      Plan of care reviewed with patient and discussed with nursing staff     Labs reviewed, Medications reviewed and Radiology images reviewed  Abrams catheter: No Abrams      DVT Prophylaxis: Contraindicated - High bleeding risk  DVT prophylaxis - mechanical: SCDs         For complexity-based billing, please refer to the history, exam, and decison making above. In addition, I spent 35 minutes caring for the patient today. More than 50% of the time was spent counseling and coordinating care.    I have discussed with RN and CM and SW and other consultants about patient's plan.

## 2017-07-15 NOTE — PROGRESS NOTES
Received report from night shift RN, assumed care. Pt. Is awake, on bed. A&Ox4, stand by assist , pt. With complaints of headache, medicated per MAR. half-way guard at bedside at all times, all limbs CMS, WNL. Plan of care was safety, comfort and rest. Discussed plan of care. Call light and personal belongings within reach, bed kept low, treaded socks on. Assisted as necessary. Kept rested and comfortable at all times.

## 2017-07-16 LAB
GLUCOSE BLD-MCNC: 235 MG/DL (ref 65–99)
GLUCOSE BLD-MCNC: 262 MG/DL (ref 65–99)
GLUCOSE BLD-MCNC: 267 MG/DL (ref 65–99)
GLUCOSE BLD-MCNC: 286 MG/DL (ref 65–99)

## 2017-07-16 PROCEDURE — 700102 HCHG RX REV CODE 250 W/ 637 OVERRIDE(OP): Performed by: HOSPITALIST

## 2017-07-16 PROCEDURE — A9270 NON-COVERED ITEM OR SERVICE: HCPCS | Performed by: HOSPITALIST

## 2017-07-16 PROCEDURE — 770006 HCHG ROOM/CARE - MED/SURG/GYN SEMI*

## 2017-07-16 PROCEDURE — 82962 GLUCOSE BLOOD TEST: CPT | Mod: 91

## 2017-07-16 PROCEDURE — 99232 SBSQ HOSP IP/OBS MODERATE 35: CPT | Performed by: INTERNAL MEDICINE

## 2017-07-16 PROCEDURE — A9270 NON-COVERED ITEM OR SERVICE: HCPCS | Performed by: INTERNAL MEDICINE

## 2017-07-16 PROCEDURE — 700102 HCHG RX REV CODE 250 W/ 637 OVERRIDE(OP): Performed by: INTERNAL MEDICINE

## 2017-07-16 RX ADMIN — INSULIN HUMAN 23 UNITS: 100 INJECTION, SUSPENSION SUBCUTANEOUS at 17:49

## 2017-07-16 RX ADMIN — INSULIN HUMAN 20 UNITS: 100 INJECTION, SUSPENSION SUBCUTANEOUS at 06:50

## 2017-07-16 RX ADMIN — MAGNESIUM HYDROXIDE 30 ML: 400 SUSPENSION ORAL at 09:06

## 2017-07-16 RX ADMIN — STANDARDIZED SENNA CONCENTRATE AND DOCUSATE SODIUM 2 TABLET: 8.6; 5 TABLET, FILM COATED ORAL at 21:11

## 2017-07-16 RX ADMIN — ACETAMINOPHEN 650 MG: 325 TABLET, FILM COATED ORAL at 15:57

## 2017-07-16 RX ADMIN — ACETAMINOPHEN 650 MG: 325 TABLET, FILM COATED ORAL at 22:11

## 2017-07-16 RX ADMIN — STANDARDIZED SENNA CONCENTRATE AND DOCUSATE SODIUM 2 TABLET: 8.6; 5 TABLET, FILM COATED ORAL at 09:03

## 2017-07-16 RX ADMIN — LISINOPRIL 10 MG: 10 TABLET ORAL at 09:01

## 2017-07-16 RX ADMIN — SIMVASTATIN 10 MG: 10 TABLET, FILM COATED ORAL at 21:11

## 2017-07-16 RX ADMIN — ACETAMINOPHEN 650 MG: 325 TABLET, FILM COATED ORAL at 09:02

## 2017-07-16 RX ADMIN — AMOXICILLIN AND CLAVULANATE POTASSIUM 1 TABLET: 875; 125 TABLET, FILM COATED ORAL at 09:01

## 2017-07-16 RX ADMIN — NORTRIPTYLINE HYDROCHLORIDE 10 MG: 10 CAPSULE ORAL at 21:11

## 2017-07-16 RX ADMIN — AMOXICILLIN AND CLAVULANATE POTASSIUM 1 TABLET: 875; 125 TABLET, FILM COATED ORAL at 21:11

## 2017-07-16 RX ADMIN — OMEPRAZOLE 40 MG: 20 CAPSULE, DELAYED RELEASE ORAL at 21:11

## 2017-07-16 RX ADMIN — ACETAMINOPHEN 650 MG: 325 TABLET, FILM COATED ORAL at 02:29

## 2017-07-16 RX ADMIN — OMEPRAZOLE 40 MG: 20 CAPSULE, DELAYED RELEASE ORAL at 09:01

## 2017-07-16 ASSESSMENT — ENCOUNTER SYMPTOMS
VOMITING: 0
BACK PAIN: 0
ABDOMINAL PAIN: 0
PSYCHIATRIC NEGATIVE: 1
DIAPHORESIS: 0
PALPITATIONS: 0
HEADACHES: 1
SORE THROAT: 0
LOSS OF CONSCIOUSNESS: 0
SHORTNESS OF BREATH: 0
FOCAL WEAKNESS: 0
PHOTOPHOBIA: 0
FALLS: 0
EYE DISCHARGE: 0
WHEEZING: 0
FLANK PAIN: 0
SPEECH CHANGE: 0
HEMOPTYSIS: 0
NECK PAIN: 0
COUGH: 0
SEIZURES: 0
NAUSEA: 0
DOUBLE VISION: 0
FEVER: 0

## 2017-07-16 ASSESSMENT — PATIENT HEALTH QUESTIONNAIRE - PHQ9
SUM OF ALL RESPONSES TO PHQ9 QUESTIONS 1 AND 2: 0
1. LITTLE INTEREST OR PLEASURE IN DOING THINGS: NOT AT ALL
2. FEELING DOWN, DEPRESSED, IRRITABLE, OR HOPELESS: NOT AT ALL
SUM OF ALL RESPONSES TO PHQ QUESTIONS 1-9: 0

## 2017-07-16 ASSESSMENT — PAIN SCALES - GENERAL
PAINLEVEL_OUTOF10: 0
PAINLEVEL_OUTOF10: 0
PAINLEVEL_OUTOF10: 5
PAINLEVEL_OUTOF10: ASSUMED PAIN PRESENT

## 2017-07-16 NOTE — DISCHARGE PLANNING
Medical Social Work  Talked to patient's guard, who gave me patient's name, along with the correct extension to call.     PC to [phone].  Told they can accommodate the patients injectable insulin.  I will need to contact the Utilization Management Nurse when patient is ready at extension [phone].

## 2017-07-16 NOTE — PROGRESS NOTES
[facility] Hospitalist Progress Note    Date of Service: 2017    Chief Complaint  61 y.o. male admitted 7/10/2017 with DKA GIB    Interval Problem Update  7/15 patient still complains of headache, however no significant fever or leukocytosis. Clinically not impressive. Patient described Patient's pain is local, 6-8/10, intermittent and does not radiate to other location, sharp and with some tingling. Can be controlled by pain meds.   feeling ok but BS still most of time > 250. Will need to adjust the insulin dosage prior to discharge. Confirmed with the [facility] that patient could have easily injection in the [facility].    Consultants/Specialty  GI            Review of Systems   Constitutional: Negative for fever, malaise/fatigue and diaphoresis.   HENT: Positive for hearing loss. Negative for congestion, ear pain, nosebleeds and sore throat.    Eyes: Negative for double vision, photophobia and discharge.   Respiratory: Negative for cough, hemoptysis, shortness of breath and wheezing.    Cardiovascular: Negative for chest pain, palpitations and leg swelling.   Gastrointestinal: Negative for nausea, vomiting, abdominal pain and melena.   Genitourinary: Negative for dysuria, urgency and flank pain.   Musculoskeletal: Negative for back pain, falls and neck pain.   Skin: Negative.    Neurological: Positive for headaches. Negative for speech change, focal weakness, seizures and loss of consciousness.   Psychiatric/Behavioral: Negative.       Physical Exam  Laboratory/Imaging   Hemodynamics  Temp (24hrs), Av.2 °C (97.1 °F), Min:36.2 °C (97.1 °F), Max:36.2 °C (97.1 °F)   Temperature: 36.2 °C (97.1 °F)  Pulse  Av.9  Min: 65  Max: 115    Blood Pressure: 143/85 mmHg      Respiratory      Respiration: 20, Pulse Oximetry: 96 %             Fluids    Intake/Output Summary (Last 24 hours) at 17 0803  Last data filed at 07/15/17 1500   Gross per 24 hour   Intake      0 ml   Output    700 ml   Net   -700 ml        Nutrition  Orders Placed This Encounter   Procedures   • DIET ORDER     Standing Status: Standing      Number of Occurrences: 1      Standing Expiration Date:      Order Specific Question:  Diet:     Answer:  Diabetic [3]     Physical Exam   Constitutional: He is oriented to person, place, and time. He appears well-developed. No distress.   HENT:   Head: Normocephalic and atraumatic.   Left Ear: No drainage, swelling or tenderness. Tympanic membrane is not bulging.   Eyes: EOM are normal. Right eye exhibits no discharge. Left eye exhibits no discharge.   Neck: Neck supple. No JVD present.   Cardiovascular: Normal rate and regular rhythm.  Exam reveals no gallop and no friction rub.    No murmur heard.  Pulmonary/Chest: Effort normal and breath sounds normal. No stridor. No respiratory distress. He has no rales. He exhibits no tenderness.   Abdominal: Soft. He exhibits no distension. There is no tenderness. There is no guarding.   Musculoskeletal: He exhibits no edema or tenderness.   Neurological: He is alert and oriented to person, place, and time. No cranial nerve deficit. He exhibits normal muscle tone.   Skin: Skin is warm and dry. He is not diaphoretic. No cyanosis. Nails show no clubbing.   Psychiatric: He has a normal mood and affect. His behavior is normal. Thought content normal.   Nursing note and vitals reviewed.      Recent Labs      07/14/17   0525   WBC  9.7   RBC  4.44*   HEMOGLOBIN  13.4*   HEMATOCRIT  38.7*   MCV  87.2   MCH  30.2   MCHC  34.6   RDW  40.3   PLATELETCT  385   MPV  9.0     Recent Labs      07/14/17   0525   SODIUM  134*   POTASSIUM  4.5   CHLORIDE  99   CO2  23   GLUCOSE  243*   BUN  17   CREATININE  0.94   CALCIUM  9.5                      Assessment/Plan     GI bleed  Assessment & Plan  EGD esophagitis  Continue prilosec  Hemoglobin stable    DKA (diabetic ketoacidoses) (CMS-[facility])  Assessment & Plan  resolved    Headache  Assessment & Plan  Discussed with Dr. Desai   temporal artery duplex normal, he does not feel that biopsy is warranted  Will d/c prednisone  Given mastoid fluids and decrease hearing will empirically treat with augmentin    Anemia associated with acute blood loss  Assessment & Plan  Hemoglobin 11.7->13.4 Stable, monitor    Dyslipidemia  Assessment & Plan  simvaststain    Type 2 diabetes mellitus without complication (CMS-HCC)  Assessment & Plan  With hyperglycemia, not controlled  Increased cbg's due to to prednisone (stopped per above)  Increase NPH to 23 bid, ISS, monitor cbg's and adjust  Pending confirm from longterm to be able to give NPH injection      Hyponatremia  Assessment & Plan  Improved  monitor      Plan of care reviewed with patient and discussed with nursing staff     Labs reviewed, Medications reviewed and Radiology images reviewed  Abrams catheter: No Abrams      DVT Prophylaxis: Contraindicated - High bleeding risk  DVT prophylaxis - mechanical: SCDs         For complexity-based billing, please refer to the history, exam, and decison making above. In addition, I spent 35 minutes caring for the patient today. More than 50% of the time was spent counseling and coordinating care.    I have discussed with RN and SHON and SW and other consultants about patient's plan.

## 2017-07-16 NOTE — PROGRESS NOTES
Assessment completed. Pt is A&Ox4, somewhat forgetful. States he has not had BM in 7 days but [facility] guards at bedside state they think he has had one. Given PRN MOM and instructed to drink plenty of fluids and ambulate to promote BM. Scheduled senna given also. Pt denies abdominal pain and he is not distended or firm. Reports headache and requests tylenol for tx, medicated with PRN acetaminophen as ordered and encouraged to rest and drink plenty of fluids. MD rounding states pt not discharging today due to continuous elevated FSBG >200, SW alerted and care coordination is underway. Bed is low with call light in reach. 2 [facility] guards at bedside and pt restrained with [facility] provided handcuffs on L ankle to bed per [facility] protocol. CMS check good in restrained extremity. No s/s distress noted. Calm & cooperative.

## 2017-07-17 LAB
ANION GAP SERPL CALC-SCNC: 9 MMOL/L (ref 0–11.9)
BUN SERPL-MCNC: 12 MG/DL (ref 8–22)
CALCIUM SERPL-MCNC: 9.4 MG/DL (ref 8.5–10.5)
CHLORIDE SERPL-SCNC: 96 MMOL/L (ref 96–112)
CO2 SERPL-SCNC: 27 MMOL/L (ref 20–33)
CREAT SERPL-MCNC: 0.84 MG/DL (ref 0.5–1.4)
GFR SERPL CREATININE-BSD FRML MDRD: >60 ML/MIN/1.73 M 2
GLUCOSE BLD-MCNC: 273 MG/DL (ref 65–99)
GLUCOSE BLD-MCNC: 336 MG/DL (ref 65–99)
GLUCOSE BLD-MCNC: 357 MG/DL (ref 65–99)
GLUCOSE SERPL-MCNC: 237 MG/DL (ref 65–99)
POTASSIUM SERPL-SCNC: 3.8 MMOL/L (ref 3.6–5.5)
SODIUM SERPL-SCNC: 132 MMOL/L (ref 135–145)

## 2017-07-17 PROCEDURE — 770006 HCHG ROOM/CARE - MED/SURG/GYN SEMI*

## 2017-07-17 PROCEDURE — 700102 HCHG RX REV CODE 250 W/ 637 OVERRIDE(OP): Performed by: INTERNAL MEDICINE

## 2017-07-17 PROCEDURE — 36415 COLL VENOUS BLD VENIPUNCTURE: CPT

## 2017-07-17 PROCEDURE — 82962 GLUCOSE BLOOD TEST: CPT

## 2017-07-17 PROCEDURE — A9270 NON-COVERED ITEM OR SERVICE: HCPCS | Performed by: INTERNAL MEDICINE

## 2017-07-17 PROCEDURE — 700102 HCHG RX REV CODE 250 W/ 637 OVERRIDE(OP): Performed by: HOSPITALIST

## 2017-07-17 PROCEDURE — 99232 SBSQ HOSP IP/OBS MODERATE 35: CPT | Performed by: INTERNAL MEDICINE

## 2017-07-17 PROCEDURE — A9270 NON-COVERED ITEM OR SERVICE: HCPCS | Performed by: HOSPITALIST

## 2017-07-17 PROCEDURE — 80048 BASIC METABOLIC PNL TOTAL CA: CPT

## 2017-07-17 RX ADMIN — SIMVASTATIN 10 MG: 10 TABLET, FILM COATED ORAL at 20:13

## 2017-07-17 RX ADMIN — ACETAMINOPHEN 650 MG: 325 TABLET, FILM COATED ORAL at 12:05

## 2017-07-17 RX ADMIN — OMEPRAZOLE 40 MG: 20 CAPSULE, DELAYED RELEASE ORAL at 08:27

## 2017-07-17 RX ADMIN — LISINOPRIL 10 MG: 10 TABLET ORAL at 09:00

## 2017-07-17 RX ADMIN — INSULIN HUMAN 23 UNITS: 100 INJECTION, SUSPENSION SUBCUTANEOUS at 05:31

## 2017-07-17 RX ADMIN — ACETAMINOPHEN 650 MG: 325 TABLET, FILM COATED ORAL at 05:31

## 2017-07-17 RX ADMIN — OMEPRAZOLE 40 MG: 20 CAPSULE, DELAYED RELEASE ORAL at 20:13

## 2017-07-17 RX ADMIN — STANDARDIZED SENNA CONCENTRATE AND DOCUSATE SODIUM 2 TABLET: 8.6; 5 TABLET, FILM COATED ORAL at 20:13

## 2017-07-17 RX ADMIN — ACETAMINOPHEN 650 MG: 325 TABLET, FILM COATED ORAL at 17:10

## 2017-07-17 RX ADMIN — NORTRIPTYLINE HYDROCHLORIDE 10 MG: 10 CAPSULE ORAL at 20:13

## 2017-07-17 RX ADMIN — AMOXICILLIN AND CLAVULANATE POTASSIUM 1 TABLET: 875; 125 TABLET, FILM COATED ORAL at 08:26

## 2017-07-17 RX ADMIN — AMOXICILLIN AND CLAVULANATE POTASSIUM 1 TABLET: 875; 125 TABLET, FILM COATED ORAL at 20:13

## 2017-07-17 RX ADMIN — INSULIN HUMAN 27 UNITS: 100 INJECTION, SUSPENSION SUBCUTANEOUS at 17:05

## 2017-07-17 RX ADMIN — ACETAMINOPHEN 650 MG: 325 TABLET, FILM COATED ORAL at 23:14

## 2017-07-17 RX ADMIN — STANDARDIZED SENNA CONCENTRATE AND DOCUSATE SODIUM 2 TABLET: 8.6; 5 TABLET, FILM COATED ORAL at 08:27

## 2017-07-17 ASSESSMENT — ENCOUNTER SYMPTOMS
DOUBLE VISION: 0
CONSTIPATION: 0
EYE DISCHARGE: 0
NAUSEA: 0
DIAPHORESIS: 0
ABDOMINAL PAIN: 0
CHILLS: 0
PALPITATIONS: 0
FEVER: 0
FALLS: 0
SPUTUM PRODUCTION: 0
SEIZURES: 0
WHEEZING: 0
NECK PAIN: 0
SPEECH CHANGE: 0
COUGH: 0
FLANK PAIN: 0
PSYCHIATRIC NEGATIVE: 1
FOCAL WEAKNESS: 0
LOSS OF CONSCIOUSNESS: 0
SORE THROAT: 0
PHOTOPHOBIA: 0
SHORTNESS OF BREATH: 0
HEADACHES: 1
BACK PAIN: 0

## 2017-07-17 ASSESSMENT — PAIN SCALES - GENERAL
PAINLEVEL_OUTOF10: 2
PAINLEVEL_OUTOF10: 5
PAINLEVEL_OUTOF10: 5

## 2017-07-17 NOTE — PROGRESS NOTES
Assumed care of patient at 1915, received report from day RN at that time. Communication board updated and reviewed with pt and alf guards. Pt denies pain at this time. Bed alarm on. Assessment complete. Pt is A&Ox4 but forgetful.  During report pt stated that he didn't have BM for a month but told the day RN 7 days.  No other needs at this time. Call light within reach.

## 2017-07-17 NOTE — PROGRESS NOTES
Prison called for update to plan of care of discharge and need for notes and several other components to authorize stay. Referred to MORENO Mckinney following case and given contact information.

## 2017-07-17 NOTE — PROGRESS NOTES
Assessment completed. Pt reports ongoing head/neck pain that sometimes radiates into jaw. Educated that he is taking ABX for a chronic inflammation process in that area probably r/t infection according to test results. Reports L ear pain and hearing loss as well as [place] in R ear with some discomfort. Medicated with PRN tylenol as ordered and encouraged to drink fluids, rest, reposition, and offered warm packs with pt refused. Takes pills PO with no problems. Calm, cooperative, and pleasant. No s/s distress noted. FSBGs remain in the mid-high 200's, educated pt that sugars need to be more stable prior to discharge. [facility] guards at bedside with pt. Bed low, call light in reach, bed alarm activated. Requests for assistance appropriately. Encouraged to shower today.

## 2017-07-17 NOTE — PROGRESS NOTES
[facility] Hospitalist Progress Note    Date of Service: 2017    Chief Complaint  61 y.o. male admitted 7/10/2017 with DKA GIB    Interval Problem Update  7/15 patient still complains of headache, however no significant fever or leukocytosis. Clinically not impressive. Patient described Patient's pain is local, 6-8/10, intermittent and does not radiate to other location, sharp and with some tingling. Can be controlled by pain meds.   feeling ok but BS still most of time > 250. Will need to adjust the insulin dosage prior to discharge. Confirmed with the longterm that patient could have easily injection in the longterm.   FBS still high, need to increase insulin. Patient otherwise denies fever, chills, nausea, vomiting, adb pain, SOB, CP, headache, constipation, diarrhea, cough, or sputum.      Consultants/Specialty  GI            Review of Systems   Constitutional: Negative for fever, chills, malaise/fatigue and diaphoresis.   HENT: Positive for hearing loss. Negative for congestion, ear pain, nosebleeds, sore throat and tinnitus.    Eyes: Negative for double vision, photophobia and discharge.   Respiratory: Negative for cough, sputum production, shortness of breath and wheezing.    Cardiovascular: Negative for chest pain, palpitations and leg swelling.   Gastrointestinal: Negative for nausea, abdominal pain, constipation and melena.   Genitourinary: Negative for dysuria, frequency and flank pain.   Musculoskeletal: Negative for back pain, falls and neck pain.   Skin: Negative.    Neurological: Positive for headaches. Negative for speech change, focal weakness, seizures and loss of consciousness.   Psychiatric/Behavioral: Negative.       Physical Exam  Laboratory/Imaging   Hemodynamics  Temp (24hrs), Av °C (96.8 °F), Min:35.8 °C (96.4 °F), Max:36.3 °C (97.3 °F)   Temperature: 36.3 °C (97.3 °F)  Pulse  Av.2  Min: 61  Max: 115    Blood Pressure: 144/81 mmHg      Respiratory      Respiration: 18, Pulse  Oximetry: 96 %             Fluids  No intake or output data in the 24 hours ending 07/17/17 0703    Nutrition  Orders Placed This Encounter   Procedures   • DIET ORDER     Standing Status: Standing      Number of Occurrences: 1      Standing Expiration Date:      Order Specific Question:  Diet:     Answer:  Diabetic [3]     Physical Exam   Constitutional: He is oriented to person, place, and time. He appears well-developed.   HENT:   Head: Normocephalic and atraumatic.   Left Ear: No drainage, swelling or tenderness. Tympanic membrane is not bulging.   Eyes: EOM are normal. Right eye exhibits no discharge. Left eye exhibits no discharge.   Neck: Neck supple. No JVD present.   Cardiovascular: Normal rate and regular rhythm.  Exam reveals no gallop and no friction rub.    Pulmonary/Chest: Effort normal and breath sounds normal. No stridor. He has no wheezes. He has no rales. He exhibits no tenderness.   Abdominal: Soft. Bowel sounds are normal. He exhibits no distension. There is no tenderness. There is no rebound and no guarding.   Musculoskeletal: He exhibits no edema or tenderness.   Neurological: He is alert and oriented to person, place, and time. No cranial nerve deficit. He exhibits normal muscle tone.   Skin: Skin is warm and dry. He is not diaphoretic. No cyanosis. Nails show no clubbing.   Psychiatric: He has a normal mood and affect. His behavior is normal. Thought content normal.   Nursing note and vitals reviewed.          Recent Labs      07/17/17   0201   SODIUM  132*   POTASSIUM  3.8   CHLORIDE  96   CO2  27   GLUCOSE  237*   BUN  12   CREATININE  0.84   CALCIUM  9.4                      Assessment/Plan     GI bleed  Assessment & Plan  EGD esophagitis  Continue prilosec  Hemoglobin stable    DKA (diabetic ketoacidoses) (CMS-[facility])  Assessment & Plan  resolved    Headache  Assessment & Plan  Discussed with Dr. Desai  temporal artery duplex normal, he does not feel that biopsy is warranted  Will d/c  prednisone  Given mastoid fluids and decrease hearing will empirically treat with augmentin    Anemia associated with acute blood loss  Assessment & Plan  Hemoglobin 11.7->13.4 Stable, monitor    Dyslipidemia  Assessment & Plan  simvaststain    Type 2 diabetes mellitus without complication (CMS-[facility])  Assessment & Plan  With hyperglycemia, not controlled,   Increased cbg's due to to prednisone (stopped per above)  Increase NPH to 27 bid, ISS, monitor cbg's and adjust  Patient can receive insulin injection in senior care.      Hyponatremia  Assessment & Plan  Improved  monitor      Plan of care reviewed with patient and discussed with nursing staff     Labs reviewed, Medications reviewed and Radiology images reviewed  Abrams catheter: No Abrams      DVT Prophylaxis: Contraindicated - High bleeding risk  DVT prophylaxis - mechanical: SCDs         For complexity-based billing, please refer to the history, exam, and decison making above. In addition, I spent 35 minutes caring for the patient today. More than 50% of the time was spent counseling and coordinating care.    I have discussed with RN and CM and SW and other consultants about patient's plan.

## 2017-07-18 VITALS
BODY MASS INDEX: 23.74 KG/M2 | DIASTOLIC BLOOD PRESSURE: 74 MMHG | HEIGHT: 72 IN | RESPIRATION RATE: 14 BRPM | WEIGHT: 175.27 LBS | TEMPERATURE: 98.1 F | HEART RATE: 86 BPM | OXYGEN SATURATION: 96 % | SYSTOLIC BLOOD PRESSURE: 118 MMHG

## 2017-07-18 LAB
ANION GAP SERPL CALC-SCNC: 10 MMOL/L (ref 0–11.9)
BUN SERPL-MCNC: 21 MG/DL (ref 8–22)
CALCIUM SERPL-MCNC: 9.9 MG/DL (ref 8.5–10.5)
CHLORIDE SERPL-SCNC: 96 MMOL/L (ref 96–112)
CO2 SERPL-SCNC: 27 MMOL/L (ref 20–33)
CREAT SERPL-MCNC: 0.88 MG/DL (ref 0.5–1.4)
GFR SERPL CREATININE-BSD FRML MDRD: >60 ML/MIN/1.73 M 2
GLUCOSE BLD-MCNC: 184 MG/DL (ref 65–99)
GLUCOSE BLD-MCNC: 246 MG/DL (ref 65–99)
GLUCOSE BLD-MCNC: 249 MG/DL (ref 65–99)
GLUCOSE SERPL-MCNC: 90 MG/DL (ref 65–99)
POTASSIUM SERPL-SCNC: 3.8 MMOL/L (ref 3.6–5.5)
SODIUM SERPL-SCNC: 133 MMOL/L (ref 135–145)

## 2017-07-18 PROCEDURE — A9270 NON-COVERED ITEM OR SERVICE: HCPCS | Performed by: HOSPITALIST

## 2017-07-18 PROCEDURE — 36415 COLL VENOUS BLD VENIPUNCTURE: CPT

## 2017-07-18 PROCEDURE — A9270 NON-COVERED ITEM OR SERVICE: HCPCS | Performed by: INTERNAL MEDICINE

## 2017-07-18 PROCEDURE — 80048 BASIC METABOLIC PNL TOTAL CA: CPT

## 2017-07-18 PROCEDURE — 700102 HCHG RX REV CODE 250 W/ 637 OVERRIDE(OP): Performed by: INTERNAL MEDICINE

## 2017-07-18 PROCEDURE — 700102 HCHG RX REV CODE 250 W/ 637 OVERRIDE(OP): Performed by: HOSPITALIST

## 2017-07-18 PROCEDURE — 82962 GLUCOSE BLOOD TEST: CPT | Mod: 91

## 2017-07-18 PROCEDURE — 99239 HOSP IP/OBS DSCHRG MGMT >30: CPT | Performed by: FAMILY MEDICINE

## 2017-07-18 RX ORDER — OMEPRAZOLE 40 MG/1
40 CAPSULE, DELAYED RELEASE ORAL 2 TIMES DAILY
Qty: 60 CAP | Refills: 0 | Status: SHIPPED | OUTPATIENT
Start: 2017-07-18

## 2017-07-18 RX ORDER — AMOXICILLIN AND CLAVULANATE POTASSIUM 875; 125 MG/1; MG/1
1 TABLET, FILM COATED ORAL EVERY 12 HOURS
Qty: 4 TAB | Refills: 0 | Status: SHIPPED | OUTPATIENT
Start: 2017-07-18

## 2017-07-18 RX ADMIN — OMEPRAZOLE 40 MG: 20 CAPSULE, DELAYED RELEASE ORAL at 07:54

## 2017-07-18 RX ADMIN — AMOXICILLIN AND CLAVULANATE POTASSIUM 1 TABLET: 875; 125 TABLET, FILM COATED ORAL at 07:54

## 2017-07-18 RX ADMIN — STANDARDIZED SENNA CONCENTRATE AND DOCUSATE SODIUM 2 TABLET: 8.6; 5 TABLET, FILM COATED ORAL at 07:54

## 2017-07-18 RX ADMIN — INSULIN HUMAN 27 UNITS: 100 INJECTION, SUSPENSION SUBCUTANEOUS at 06:29

## 2017-07-18 RX ADMIN — ACETAMINOPHEN 650 MG: 325 TABLET, FILM COATED ORAL at 05:06

## 2017-07-18 RX ADMIN — ACETAMINOPHEN 650 MG: 325 TABLET, FILM COATED ORAL at 12:01

## 2017-07-18 RX ADMIN — OXYCODONE HYDROCHLORIDE 5 MG: 5 TABLET ORAL at 06:36

## 2017-07-18 RX ADMIN — LISINOPRIL 10 MG: 10 TABLET ORAL at 07:54

## 2017-07-18 ASSESSMENT — PAIN SCALES - GENERAL
PAINLEVEL_OUTOF10: 10
PAINLEVEL_OUTOF10: 10

## 2017-07-18 NOTE — DISCHARGE PLANNING
Medical Social Work  Patient is m/c to go back to [facility].  PC to [phone].  Told that I will need to fax the d/c summary to them.  It will be reviewed and if any problems I will be called. Otherwise they will call with an ETA to  patient.     Faxed D/C summary to [phone]

## 2017-07-18 NOTE — PROGRESS NOTES
Received report at bedside and assumed care of patient at 1900. Pt resting comfortably in bed at this time. A/O x4, bed alarm in place, bed in low and locked position, call light within reach and used appropriately, non-slip socks on patient, hourly rounding in place. No signs of distress noted or reported at this time.

## 2017-07-18 NOTE — PROGRESS NOTES
Pt discharged back to [facility] facility. Taken via [facility] transport. No changes from morning assessment. Discharge paperwork and scripts given to guards to be taken with pt. Tangela Buckner R.N.

## 2017-07-18 NOTE — CARE PLAN
Problem: Bowel/Gastric:  Goal: Normal bowel function is maintained or improved  Intervention: Educate patient and significant other/support system about diet, fluid intake, medications and activity to promote bowel function  Educated on use of laxative PRN, ambulation, fluid intake, and gastric reflex after eating going to toilet to attempt BM. Verbalized understanding.       Problem: Pain Management  Goal: Pain level will decrease to patient’s comfort goal  Intervention: Follow pain managment plan developed in collaboration with patient and Interdisciplinary Team  Medicated with PRN tylenol for headache q6 hours, encouraged to rest and drink fluids to relieve.           
Problem: Bowel/Gastric:  Goal: Normal bowel function is maintained or improved  Outcome: PROGRESSING SLOWER THAN EXPECTED  Patient has not had bowel movement since PTA. Stool softeners given. Will implement bowel protocol. Normoactive bowel sounds.     Problem: Pain Management  Goal: Pain level will decrease to patient’s comfort goal  Outcome: PROGRESSING SLOWER THAN EXPECTED  Patient c/o headache, that is constant. Medicated per MAR.         
Problem: Bowel/Gastric:  Goal: Normal bowel function is maintained or improved  Pt had a large BM this shift.     Problem: Pain Management  Goal: Pain level will decrease to patient’s comfort goal  Will medicate for pain PRN see MAR        
Problem: Communication  Goal: The ability to communicate needs accurately and effectively will improve  Outcome: PROGRESSING AS EXPECTED    Problem: Safety  Goal: Will remain free from injury  Outcome: PROGRESSING AS EXPECTED        
Problem: Communication  Goal: The ability to communicate needs accurately and effectively will improve  Outcome: PROGRESSING AS EXPECTED  Patient able to communicate needs effectively and utilizes call light appropriately.     Problem: Infection  Goal: Will remain free from infection  Outcome: PROGRESSING AS EXPECTED  Patient remains afebrile with no outward S/S of infection.         
Problem: Communication  Goal: The ability to communicate needs accurately and effectively will improve  Outcome: PROGRESSING AS EXPECTED  Patient communicates needs accurately and effectively.    Problem: Mobility  Goal: Risk for activity intolerance will decrease  Outcome: PROGRESSING AS EXPECTED  Patient gets up to bathroom.        
Problem: Communication  Goal: The ability to communicate needs accurately and effectively will improve  Outcome: PROGRESSING AS EXPECTED  Patient educated about plan of care, procedures, treatment, and medications.  All questions answered.     Problem: Skin Integrity  Goal: Risk for impaired skin integrity will decrease  Outcome: PROGRESSING AS EXPECTED  Assessing an monitoring skin integrity and appearance.          
Problem: Infection  Goal: Will remain free from infection  Outcome: PROGRESSING AS EXPECTED        
Problem: Nutritional:  Goal: Achieve adequate nutritional intake  Patient will consume >50% of meals   Intervention: Provide diet education  Provided pt with DM diet ed handouts from the [employer] diet manual.   Also provided pt with contact info for outpatient nutrition services.              
Problem: Nutritional:  Goal: Achieve adequate nutritional intake  Patient will consume >50% of meals   Outcome: PROGRESSING AS EXPECTED  Pt now NPO, was receiving a po diet w/% intake per ADLs         
Problem: Nutritional:  Goal: Achieve adequate nutritional intake  Patient will consume >50% of meals   Outcome: PROGRESSING SLOWER THAN EXPECTED  Only 1 meal recorded at this time             
Problem: Nutritional:  Goal: Achieve adequate nutritional intake  Patient will consume >50% of meals  Outcome: NOT MET  Pt currently NPO         
Problem: Pain Management  Goal: Pain level will decrease to patient’s comfort goal  Intervention: Follow pain managment plan developed in collaboration with patient and Interdisciplinary Team  Medicated with PRN tyelonol q6 hours as ordered for head/neck pain.  Intervention: Educate and implement non-pharmacologic comfort measures. Examples: relaxation, distration, play therapy, activity therapy, massage, etc.    07/17/17 1347   OTHER   Intervention Medication (see MAR);Warm Pack;Therapeutic Presence;Repositioned;Rest               
Problem: Pain Management  Goal: Pain level will decrease to patient’s comfort goal  Outcome: PROGRESSING AS EXPECTED  Pt. With complaints of headache, medicated per MAR.     Problem: Skin Integrity  Goal: Risk for impaired skin integrity will decrease  Outcome: PROGRESSING AS EXPECTED  CMS within normal. Skin free from irritation.         
Problem: Safety  Goal: Will remain free from falls  Bed locked and in lowest position, slip resistant socks on pt, pt educated on staying in bed, understanding verbalized.    Problem: Pain Management  Goal: Pain level will decrease to patient’s comfort goal  PRN pain medication administered for headache of 7/10 pain to achieve pt desired comfort level.     Problem: Skin Integrity  Goal: Risk for impaired skin integrity will decrease  Shackle alternated between ankles q2hrs, head to toe skin assessment completed at start of shift,         
Problem: Safety  Goal: Will remain free from falls  Outcome: PROGRESSING AS EXPECTED        
Problem: Safety  Goal: Will remain free from falls  Outcome: PROGRESSING AS EXPECTED  Treaded slipper socks in use with one person assist when ambulating. Otherwise, bed low and lock and patient shackled to bed.     Problem: Bowel/Gastric:  Goal: Will not experience complications related to bowel motility  Outcome: PROGRESSING SLOWER THAN EXPECTED  Patient's last BM was PTA; however patient had been NPO until today with only clear liquids started today. Abdomen is somewhat distended, tender in RUQ yet soft and bowel sounds are normoactive.         
Problem: Venous Thromboembolism (VTW)/Deep Vein Thrombosis (DVT) Prevention:  Goal: Patient will participate in Venous Thrombosis (VTE)/Deep Vein Thrombosis (DVT)Prevention Measures  Outcome: PROGRESSING AS EXPECTED  SCDs in use.    Problem: Skin Integrity  Goal: Risk for impaired skin integrity will decrease  Outcome: PROGRESSING AS EXPECTED  Shackle position changed and protective padding placed under ankle cuff. Pillows in use for support and positioning and patient able to turn self.         
Alert and oriented, no focal deficits, no motor or sensory deficits.

## 2017-07-18 NOTE — DISCHARGE SUMMARY
Medical Social Work  Patient is m/c to go back to [facility].  PC to [phone].  Told that I will need to fax the d/c summary to them.  It will be reviewed and if any problems I will be called. Otherwise they will call with an ETA to  patient.     Faxed D/C summary to [phone]

## 2017-07-18 NOTE — DISCHARGE PLANNING
Medical Social Work  Voice message from [facility], after reviewing the D/C summary they can take patient back. Soonest they can pick him up is between 3:30 and 4:00, requested a call back.    PC to [facility] at [phone]; message left that the time is okay to  the patient.

## 2017-07-18 NOTE — DISCHARGE SUMMARY
CHIEF COMPLAINT ON ADMISSION  Chief Complaint   Patient presents with   • Blood in Vomit   • High Blood Sugar       CODE STATUS  Full Code    HPI & HOSPITAL COURSE  This is a 62 y.o. male here with DKA GIB.  GI bleed  Assessment & Plan  EGD esophagitis  Continue prilosec 40MG PO BID  Hemoglobin stable    DKA (diabetic ketoacidoses)   Assessment & Plan  Resolved    Headache  Assessment & Plan  HAS RESOLVED   Dr. Desai  temporal artery duplex normal, he does not feel that biopsy is warranted.  Given mastoid fluids and decrease hearing will empirically treat with augmentin    Type 2 diabetes mellitus without complication (CMS-[facility])  Assessment & Plan  With hyperglycemia, AND IS BETTER CONTROLLED  NPH 24 bid, ISS, monitor cbg's and adjust  Patient can receive insulin injection in half-way.  ACCU CHECKS BEFORE MEALS AND AT BEDTIME.    Therefore, he is discharged in good and stable condition with close outpatient follow-up.    SPECIFIC OUTPATIENT FOLLOW-UP  PCP IN 1 WEEK    DISCHARGE PROBLEM LIST  Active Problems:    GI bleed POA: Unknown    DKA (diabetic ketoacidoses) (CMS-[facility]) POA: Unknown    Headache POA: Unknown    Anemia associated with acute blood loss POA: Unknown    Dyslipidemia POA: Unknown    Type 2 diabetes mellitus without complication (CMS-[facility]) POA: Unknown    Hyponatremia POA: Unknown  Resolved Problems:    Leukocytosis POA: Unknown      FOLLOW UP  No future appointments.  No follow-up provider specified.    MEDICATIONS ON DISCHARGE   Edthkrystal, Nine   Home Medication Instructions SOURAV:[id number]    Printed on:07/18/17 1020   Medication Information                      amoxicillin-clavulanate (AUGMENTIN) 875-125 MG Tab  Take 1 Tab by mouth every 12 hours.             insulin NPH (HUMULIN,NOVOLIN) 100 UNIT/ML Suspension  Inject 24 Units as instructed 2 Times a Day.             lisinopril (PRINIVIL) 10 MG Tab  Take 10 mg by mouth every day.             metformin (GLUCOPHAGE) 850 MG Tab  Take 850 mg by mouth 3  times a day.             omeprazole (PRILOSEC) 40 MG delayed-release capsule  Take 1 Cap by mouth 2 Times a Day.             simvastatin (ZOCOR) 10 MG Tab  Take 10 mg by mouth every bedtime.                 DIET  Orders Placed This Encounter   Procedures   • DIET ORDER     Standing Status: Standing      Number of Occurrences: 1      Standing Expiration Date:      Order Specific Question:  Diet:     Answer:  Diabetic [3]       ACTIVITY  As tolerated.  Weight bearing as tolerated      CONSULTATIONS  GI AND GENERAL SURGERY    PROCEDURES  NONE    LABORATORY  Lab Results   Component Value Date/Time    SODIUM 133* 07/18/2017 01:10 AM    POTASSIUM 3.8 07/18/2017 01:10 AM    CHLORIDE 96 07/18/2017 01:10 AM    CO2 27 07/18/2017 01:10 AM    GLUCOSE 90 07/18/2017 01:10 AM    BUN 21 07/18/2017 01:10 AM    CREATININE 0.88 07/18/2017 01:10 AM        Lab Results   Component Value Date/Time    WBC 9.7 07/14/2017 05:25 AM    HEMOGLOBIN 13.4* 07/14/2017 05:25 AM    HEMATOCRIT 38.7* 07/14/2017 05:25 AM    PLATELET COUNT 385 07/14/2017 05:25 AM        Total time of the discharge process exceeds 38 minutes

## 2017-07-18 NOTE — DISCHARGE INSTRUCTIONS
Discharge Instructions    Discharged to other by car with escort. Discharged via wheelchair, hospital escort: Yes.  Special equipment needed: Not Applicable    Be sure to schedule a follow-up appointment with your primary care doctor or any specialists as instructed.     Discharge Plan:   Diet Plan: Discussed  Activity Level: Discussed  Smoking Cessation Offered: Patient Refused  Confirmed Follow up Appointment: Patient to Call and Schedule Appointment (follow up with PCP in 1 week)  Confirmed Symptoms Management: Discussed  Medication Reconciliation Updated: Yes  Pneumococcal Vaccine Given - only chart on this line when given: Given (See MAR)  Influenza Vaccine Indication: Indicated: Not available from distributor/    I understand that a diet low in cholesterol, fat, and sodium is recommended for good health. Unless I have been given specific instructions below for another diet, I accept this instruction as my diet prescription.   Other diet: Regular    Special Instructions: None    · Is patient discharged on Warfarin / Coumadin?   No     · Is patient Post Blood Transfusion?  No    Depression / Suicide Risk    As you are discharged from this [facility] facility, it is important to learn how to keep safe from harming yourself.    Recognize the warning signs:  · Abrupt changes in personality, positive or negative- including increase in energy   · Giving away possessions  · Change in eating patterns- significant weight changes-  positive or negative  · Change in sleeping patterns- unable to sleep or sleeping all the time   · Unwillingness or inability to communicate  · Depression  · Unusual sadness, discouragement and loneliness  · Talk of wanting to die  · Neglect of personal appearance   · Rebelliousness- reckless behavior  · Withdrawal from people/activities they love  · Confusion- inability to concentrate     If you or a loved one observes any of these behaviors or has concerns about self-harm,  here's what you can do:  · Talk about it- your feelings and reasons for harming yourself  · Remove any means that you might use to hurt yourself (examples: pills, rope, extension cords, firearm)  · Get professional help from the community (Mental Health, Substance Abuse, psychological counseling)  · Do not be alone:Call your Safe Contact- someone whom you trust who will be there for you.  · Call your local CRISIS HOTLINE 343-8040 or 758-190-3054  · Call your local Children's Mobile Crisis Response Team [place] (228) 124-6508 or www.Celly  · Call the toll free National Suicide Prevention Hotlines   · National Suicide Prevention Lifeline 254-373-PDBO (5247)  · National Hope Line Network 800-SUICIDE (136-2343)

## 2017-10-04 PROBLEM — R51.9 HEADACHE: Status: ACTIVE | Noted: 2017-07-11

## (undated) DEVICE — SYRINGE 3 CC 22 GA X 1-1/2 - NDL SAFETY (50/BX 8BX/CA)

## (undated) DEVICE — KIT CUSTOM PROCEDURE SINGLE FOR ENDO  (15/CA)

## (undated) DEVICE — BITE BLOCK ADULT 60FR (100EA/CA)

## (undated) DEVICE — CON SEDATION/>5 YR 1ST 15 MIN

## (undated) DEVICE — TUBE CONNECTING SUCTION - CLEAR PLASTIC STERILE 72 IN (50EA/CA)

## (undated) DEVICE — GOWN SURGEONS X-LARGE - DISP. (30/CA)

## (undated) DEVICE — SYRINGE 6 CC 20 GA X 1 1/2 - NDL SAFETY  (50/BX)

## (undated) DEVICE — SPONGE GAUZE NON-STERILE 4X4 - (2000/CA 10PK/CA)

## (undated) DEVICE — BASIN EMESIS DISP. - (250/CA)

## (undated) DEVICE — SOD. CHL 10CC SYRINGE PREFILL - W/10 CC (30/BX)

## (undated) DEVICE — SYRINGE DISP. 50CC LS - (40/BX)

## (undated) DEVICE — CANISTER SUCTION RIGID RED 1500CC (40EA/CA)